# Patient Record
Sex: FEMALE | Race: BLACK OR AFRICAN AMERICAN | NOT HISPANIC OR LATINO | Employment: FULL TIME | URBAN - METROPOLITAN AREA
[De-identification: names, ages, dates, MRNs, and addresses within clinical notes are randomized per-mention and may not be internally consistent; named-entity substitution may affect disease eponyms.]

---

## 2019-03-11 ENCOUNTER — HOSPITAL ENCOUNTER (OUTPATIENT)
Dept: URGENT CARE | Facility: CLINIC | Age: 49
Discharge: HOME | End: 2019-03-11

## 2019-03-29 ENCOUNTER — HOSPITAL ENCOUNTER (OUTPATIENT)
Dept: URGENT CARE | Facility: CLINIC | Age: 49
Discharge: HOME | End: 2019-03-29
Attending: NURSE PRACTITIONER

## 2019-03-31 LAB — BACTERIA SPEC AEROBE CULT: NORMAL

## 2019-04-17 ENCOUNTER — HOSPITAL ENCOUNTER (OUTPATIENT)
Dept: URGENT CARE | Facility: CLINIC | Age: 49
Discharge: HOME | End: 2019-04-17
Attending: FAMILY MEDICINE

## 2019-09-11 ENCOUNTER — HOSPITAL ENCOUNTER (OUTPATIENT)
Dept: INFUSION THERAPY | Facility: HOSPITAL | Age: 49
Setting detail: RECURRING SERIES
Discharge: HOME | End: 2019-09-30

## 2020-02-03 ENCOUNTER — HOSPITAL ENCOUNTER (OUTPATIENT)
Dept: URGENT CARE | Facility: CLINIC | Age: 50
Discharge: HOME | End: 2020-02-03

## 2020-02-18 ENCOUNTER — HOSPITAL ENCOUNTER (OUTPATIENT)
Dept: URGENT CARE | Facility: CLINIC | Age: 50
Discharge: HOME | End: 2020-02-18
Attending: EMERGENCY MEDICINE

## 2020-02-20 LAB — BACTERIA SPEC AEROBE CULT: NORMAL

## 2020-06-25 ENCOUNTER — HOSPITAL ENCOUNTER (OUTPATIENT)
Dept: MAMMOGRAPHY | Facility: HOSPITAL | Age: 50
Discharge: HOME | End: 2020-06-25
Attending: NURSE PRACTITIONER

## 2020-07-16 ENCOUNTER — OFFICE VISIT CONVERTED (OUTPATIENT)
Dept: SURGERY | Facility: CLINIC | Age: 50
End: 2020-07-16
Attending: NURSE PRACTITIONER

## 2020-08-04 ENCOUNTER — HOSPITAL ENCOUNTER (OUTPATIENT)
Dept: MAMMOGRAPHY | Facility: HOSPITAL | Age: 50
Discharge: HOME | End: 2020-08-04
Attending: NURSE PRACTITIONER

## 2020-08-26 ENCOUNTER — HOSPITAL ENCOUNTER (OUTPATIENT)
Dept: PREADMISSION TESTING | Facility: HOSPITAL | Age: 50
Discharge: HOME | End: 2020-08-26
Attending: SURGERY

## 2020-08-27 LAB — SARS-COV-2 RNA SPEC QL NAA+PROBE: NOT DETECTED

## 2020-08-31 ENCOUNTER — HOSPITAL ENCOUNTER (OUTPATIENT)
Dept: GASTROENTEROLOGY | Facility: HOSPITAL | Age: 50
Setting detail: HOSPITAL OUTPATIENT SURGERY
Discharge: HOME | End: 2020-08-31
Attending: SURGERY

## 2020-08-31 LAB — HCG UR QL: NEGATIVE

## 2020-09-22 ENCOUNTER — OFFICE VISIT CONVERTED (OUTPATIENT)
Dept: SURGERY | Facility: CLINIC | Age: 50
End: 2020-09-22
Attending: SURGERY

## 2021-02-04 ENCOUNTER — HOSPITAL ENCOUNTER (OUTPATIENT)
Dept: ULTRASOUND IMAGING | Facility: HOSPITAL | Age: 51
Discharge: HOME | End: 2021-02-04

## 2021-05-10 NOTE — H&P
History and Physical      Patient Name: Julia Spain   Patient ID: [id number]   Sex: Female   YOB: 1970    Primary Care Provider: CHRISTINA GAN   Referring Provider: CHRISTINA GAN    Visit Date: July 16, 2020    Provider: ELVIA Goncalves   [facility]   Location Address: [address] [place] [address]  [id number]   Location Phone: [phone]          Chief Complaint  · Age 50 or over  · Here today for a pre-surgical colon screening visit  · Epigastric Pain  · Requesting EGD and Colonoscopy      History Of Present Illness  The patient is a 49 year old /Black female presenting [facility] the [facility] on a referral from CHRISTINA GAN.   Julia Spain needs to have a diagnostic colonoscopy and EGD.   Patient states that they have had a colonoscopy. 3 years ago   Patient currently complains of: epigastric pain   Patient Does not have family history of colon cancer.      Patient presents today on referral from Christina Akhtar for epigastric pain, anemia (hgb: 11.9) and for screening colonoscopy.  Admits to fatigue. Patient denies any lower abdominal pain, diarrhea, or rectal bleeding.  She denies any family history of colorectal cancer.      Patient had a cholecystectomy in 2017.  Last colonoscopy was 3 years ago, she does not remember who that is with her with the results of that was.    I have requested medical records from her PCP and referring provider.       Past Medical History  Disease Name Date Onset Notes   Allergic rhinitis, chronic --  --    Anemia, Unspecified --  --    Bowel Disease --  --    GERD --  --    High blood pressure --  --          Past Surgical History  Procedure Name Date Notes   Appendectomy --  --    Gallbladder --  --          Medication List  Name Date Started Instructions   Calcium 600 600 mg calcium (1,500 mg) oral tablet  take 1 tablet by oral route daily   cetirizine 5 mg oral tablet  take 1  tablet (5 mg) by oral route once daily   Flonase Allergy Relief 50 mcg/actuation nasal spray,suspension  spray 1 spray (50 mcg) in each nostril by intranasal route once daily   garlic 1,000 mg oral capsule  take 1 capsule by oral route daily   hydralazine 25 mg oral tablet  take 1 tablet by oral route As needed   lisinopril-hydrochlorothiazide 10-12.5 mg oral tablet  take 1 tablet by oral route once daily   Prilosec OTC 20 mg oral tablet,delayed release (DR/EC)  take 1 tablet by oral route daily   promethazine 6.25 mg/5 mL oral syrup  take 5 milliliters (6.25 mg) by oral route PRN   Prozac 40 mg oral capsule  take 1 capsule (40 mg) by oral route once daily in the evening   Singulair 10 mg oral tablet  take 1 tablet (10 mg) by oral route once daily in the evening   Vitamin D3 125 mcg (5,000 unit) oral tablet  take 1 tablet by oral route daily         Allergy List  Allergen Name Date Reaction Notes   Dilaudid --  --  --          Family Medical History  Disease Name Relative/Age Notes   Stroke Father/   --    Heart Disease Mother/   --    Hypertension Father/  Mother/   --          Social History  Finding Status Start/Stop Quantity Notes   Alcohol use --  --/-- --  07/16/2020 - Monthly   Tobacco Never --/-- --  --          Review of Systems  · Constitutional  o Admits  o : fatigue  o Denies  o : fever, chills  · Eyes  o Denies  o : yellowish discoloration of eyes  · HENT  o Denies  o : difficulty swallowing  · Cardiovascular  o Denies  o : chest pain, chest pain on exertion  · Respiratory  o Denies  o : shortness of breath  · Gastrointestinal  o Admits  o : abdominal pain  o Denies  o : nausea, vomiting, diarrhea, constipation  · Genitourinary  o Denies  o : abnormal color of urine  · Integument  o Denies  o : rash  · Neurologic  o Denies  o : tingling or numbness  · Musculoskeletal  o Denies  o : joint pain  · Endocrine  o Denies  o : weight gain, weight loss      Vitals  Date Time BP Position Site L\R Cuff Size HR RR  "TEMP (F) WT  HT  BMI kg/m2 BSA m2 O2 Sat        07/16/2020 02:40 PM       16  265lbs 4oz 5'  8\" 40.33 2.4           Physical Examination  · Constitutional  o Appearance  o : well developed, well-nourished, patient in no apparent distress  · Head and Face  o Head  o :   § Inspection  § : atraumatic, normocephalic  o Face  o :   § Inspection  § : no facial lesions  · Eyes  o Conjunctivae  o : conjunctivae normal  o Sclerae  o : sclerae white  · Neck  o Inspection/Palpation  o : normal appearance, no masses or tenderness, trachea midline  · Respiratory  o Respiratory Effort  o : breathing unlabored  · Skin and Subcutaneous Tissue  o General Inspection  o : no lesions present, no areas of discoloration, skin turgor normal, texture normal  · Neurologic  o Mental Status Examination  o :   § Orientation  § : grossly oriented to person, place and time  § Attention  § : attention normal, concentration abilities normal  § Fund of Knowledge  § : fund of knowledge within normal limits, patient aware of current events  o Gait and Station  o : normal gait, able to stand without difficulty  · Psychiatric  o Judgement and Insight  o : judgment and insight intact  o Mood and Affect  o : mood normal, affect appropriate              Assessment  · Abdominal Pain, Epigastric     789.06/R10.13  · Anemia     285.9/D64.9  · Screening for colon cancer     V76.51/Z12.11  · Pre-op testing     V72.84/Z01.818      Plan  · Orders  o Consent for Colonoscopy Screening-Possible risk/complications, benefits, and alternatives to surgical or invasive procedure have been explained to patient and/or legal guardian. -Patient has been evaluated and can tolerate anesthesia and/or sedation. Risks, benefits, and alternatives to anesthesia and sedation have been explained to patient and/or legal guardian. () - 789.06/R10.13, 285.9/D64.9, V76.51/Z12.11, V72.84/Z01.818 - 08/31/2020  o Consent for Esophagogastroduodenoscopy (EGD) with dilation - Possible " risks/complications, benefits, and alternatives to surgical or invasive procedure have been explained to patient and/or legal guardian. - Patient has been evaluated and can tolerate anesthesia and/or sedation. Risks, benefits, and alternatives to anesthesia and sedation have been explained to patient and/or legal guardian. (43921) - 789.06/R10.13, 285.9/D64.9, V76.51/Z12.11, V72.84/Z01.818 - 08/31/2020  o [facility] Pre-Op Covid-19 Screening (74462) - V72.84/Z01.818 - 08/27/2020  · Medications  o Medications have been Reconciled  o Transition of Care or Provider Policy  · Instructions  o Surgical Facility: [facility]  o Handouts Provided Pre-Procedure Instructions including date, time, and location of procedure.   o PLAN: Proceeed with colonoscopy. Patient understands risks/benefits and is willing to proceed.   o PLAN: Proceeed with EGD. Patient understands risks/benefits and is willing to proceed.   o ***Surgical Orders***  o RISK AND BENEFITS:  o Given these options, the patient has verbally expressed an understanding of the risks of the surgery and finds these risks acceptable. Will proceed with surgery as soon as possible.  o O.R. PREP: Per protocol   o IV: Per Anesthesia  o Please sign permit for: Colonoscopy with possible biopsies by Dr. Godoy.  o Please sign permit for: Esophagogastroduodenoscopy with possible biopsies and dilation by Dr. Godoy.  o The above History and Physical Examination has been completed within 30 days of admission.  o ***Patient Status***  o Outpatient  o Follow up in the in the office post procedure.  o Advised patient she would need COVID-19 testing prior to the procedures. Educated patient she needs self isolate in between testing and procedure. Patient verbalizes understanding is willing to proceed.  o Electronically Identified Patient Education Materials Provided Electronically  · Disposition  o Call or Return if symptoms worsen or persist.            Electronically Signed  by: ELVIA Goncalves -Author on July 17, 2020 08:55:57 AM

## 2021-05-13 NOTE — PROGRESS NOTES
"   Progress Note      Patient Name: Julia Spain   Patient ID: [id number]   Sex: Female   YOB: 1970    Primary Care Provider: MAYELIN GAN   Referring Provider: MAYELIN GAN    Visit Date: September 22, 2020    Provider: Franklin Godoy MD   Location: [facility]   Location Address: [address] [place] [address]  [id number]   Location Phone: [phone]          Chief Complaint  · Follow Up Surgery      History Of Present Illness     Ms. Spain is seen after upper endoscopy and colonoscopy. She was found to have a hiatal hernia and mild esophagitis as well as diverticulosis.       Past Medical History  Allergic rhinitis, chronic; Anemia, Unspecified; Bowel Disease; GERD; High blood pressure         Past Surgical History  Appendectomy; Colonoscopy; Gallbladder; Hernia         Medication List  Calcium 600 600 mg calcium (1,500 mg) oral tablet; cetirizine 5 mg oral tablet; Flonase Allergy Relief 50 mcg/actuation nasal spray,suspension; garlic 1,000 mg oral capsule; hydralazine 25 mg oral tablet; lisinopril-hydrochlorothiazide 10-12.5 mg oral tablet; Prilosec OTC 20 mg oral tablet,delayed release (DR/EC); promethazine 6.25 mg/5 mL oral syrup; Prozac 40 mg oral capsule; Singulair 10 mg oral tablet; Vitamin D3 125 mcg (5,000 unit) oral tablet         Allergy List  Dilaudid         Family Medical History  Stroke; Heart Disease; Hypertension         Social History  Alcohol use; Tobacco (Never)         Review of Systems  · Cardiovascular  o Denies  o : chest pain on exertion, shortness of breath, lower extremity swelling  · Respiratory  o Denies  o : wheezing, chronic cough, coughing up blood  · Gastrointestinal  o Denies  o : diarrhea, chronic abdominal pain, reflux symptoms      Vitals  Date Time BP Position Site L\R Cuff Size HR RR TEMP (F) WT  HT  BMI kg/m2 BSA m2 O2 Sat HC       09/22/2020 09:16 AM       14  270lbs 0oz 5'  8\" 41.05 2.42   "             Assessment  · Encounter for examination following surgery     V67.00/Z09      Plan  · Medications  o Medications have been Reconciled  o Transition of Care or Provider Policy     She was instructed as to a high fiber diet. She will follow-up on a PRN basis.             Electronically Signed by: Rut Mitchell-, -Author on September 22, 2020 03:06:39 PM  Electronically Co-signed by: Franklin Godoy MD -Reviewer on September 28, 2020 09:10:25 AM

## 2021-05-14 VITALS — RESPIRATION RATE: 14 BRPM | HEIGHT: 68 IN | WEIGHT: 270 LBS | BODY MASS INDEX: 40.92 KG/M2

## 2021-05-15 VITALS — HEIGHT: 68 IN | WEIGHT: 265.25 LBS | RESPIRATION RATE: 16 BRPM | BODY MASS INDEX: 40.2 KG/M2

## 2021-05-22 ENCOUNTER — TRANSCRIBE ORDERS (OUTPATIENT)
Dept: ADMINISTRATIVE | Facility: HOSPITAL | Age: 51
End: 2021-05-22

## 2021-05-22 DIAGNOSIS — N64.59 ABNORMAL BREAST FINDING: Primary | ICD-10-CM

## 2021-07-06 ENCOUNTER — HOSPITAL ENCOUNTER (OUTPATIENT)
Dept: MAMMOGRAPHY | Facility: HOSPITAL | Age: 51
Discharge: HOME | End: 2021-07-06

## 2021-07-06 ENCOUNTER — HOSPITAL ENCOUNTER (OUTPATIENT)
Dept: ULTRASOUND IMAGING | Facility: HOSPITAL | Age: 51
Discharge: HOME | End: 2021-07-06

## 2021-07-06 DIAGNOSIS — N64.59 ABNORMAL BREAST FINDING: ICD-10-CM

## 2021-07-06 PROCEDURE — G0279 TOMOSYNTHESIS, MAMMO: HCPCS

## 2021-07-06 PROCEDURE — 77066 DX MAMMO INCL CAD BI: CPT | Performed by: RADIOLOGY

## 2021-07-06 PROCEDURE — 76642 ULTRASOUND BREAST LIMITED: CPT | Performed by: RADIOLOGY

## 2021-07-06 PROCEDURE — 77066 DX MAMMO INCL CAD BI: CPT

## 2021-07-06 PROCEDURE — 76642 ULTRASOUND BREAST LIMITED: CPT

## 2021-07-06 PROCEDURE — 77062 BREAST TOMOSYNTHESIS BI: CPT | Performed by: RADIOLOGY

## 2021-10-08 ENCOUNTER — OFFICE VISIT (OUTPATIENT)
Dept: ORTHOPEDIC SURGERY | Facility: CLINIC | Age: 51
End: 2021-10-08

## 2021-10-08 VITALS — HEART RATE: 55 BPM | OXYGEN SATURATION: 98 % | BODY MASS INDEX: 41.98 KG/M2 | HEIGHT: 68 IN | WEIGHT: 277 LBS

## 2021-10-08 DIAGNOSIS — M25.521 RIGHT ELBOW PAIN: ICD-10-CM

## 2021-10-08 DIAGNOSIS — M25.531 RIGHT WRIST PAIN: ICD-10-CM

## 2021-10-08 DIAGNOSIS — M77.8 RIGHT ELBOW TENDINITIS: ICD-10-CM

## 2021-10-08 DIAGNOSIS — S46.911A STRAIN OF RIGHT ELBOW, INITIAL ENCOUNTER: Primary | ICD-10-CM

## 2021-10-08 DIAGNOSIS — S63.501A SPRAIN OF RIGHT WRIST, INITIAL ENCOUNTER: ICD-10-CM

## 2021-10-08 PROCEDURE — 99203 OFFICE O/P NEW LOW 30 MIN: CPT | Performed by: ORTHOPAEDIC SURGERY

## 2021-10-08 RX ORDER — ALPRAZOLAM 0.25 MG/1
0.25 TABLET ORAL 2 TIMES DAILY PRN
COMMUNITY

## 2021-10-08 RX ORDER — CETIRIZINE HYDROCHLORIDE 10 MG/1
10 TABLET ORAL DAILY
COMMUNITY

## 2021-10-08 NOTE — PROGRESS NOTES
"Chief Complaint  Initial Evaluation of the Right Wrist and Initial Evaluation of the Right Elbow     Subjective      Julia Spain presents to [facility] for an evaluation of right wrist and right elbow. Patient was trying to stop a patient from falling at work which resulted in her injuring her arm in the process. She injured her arm in August. This is a worker's comp case. She states her elbow hurts the most but her wrist pain is on and off. She has been taking Tylenol. She takes naprosyn when she eats a large meal. She had gastric sleeve and isn't suppose to take it. She also has been wearing a elbow brace. She states she has been going to work. She states her wrist pain has been improving but her elbow pain is consistent.      Allergies   Allergen Reactions   • Dilaudid [Hydromorphone] Itching        Social History     Socioeconomic History   • Marital status: Single     Spouse name: Not on file   • Number of children: Not on file   • Years of education: Not on file   • Highest education level: Not on file   Tobacco Use   • Smoking status: Former Smoker        Review of Systems     Objective   Vital Signs:   Pulse 55   Ht 172.7 cm (68\")   Wt 126 kg (277 lb)   SpO2 98%   BMI 42.12 kg/m²       Physical Exam  Constitutional:       Appearance: Normal appearance. Patient is well-developed and normal weight.   HENT:      Head: Normocephalic.      Right Ear: Hearing and external ear normal.      Left Ear: Hearing and external ear normal.      Nose: Nose normal.   Eyes:      Conjunctiva/sclera: Conjunctivae normal.   Cardiovascular:      Rate and Rhythm: Normal rate.   Pulmonary:      Effort: Pulmonary effort is normal.      Breath sounds: No wheezing or rales.   Abdominal:      Palpations: Abdomen is soft.      Tenderness: There is no abdominal tenderness.   Musculoskeletal:      Cervical back: Normal range of motion.   Skin:     Findings: No rash.   Neurological:      Mental " Status: Patient  is alert and oriented to person, place, and time.   Psychiatric:         Mood and Affect: Mood and affect normal.         Judgment: Judgment normal.       Ortho Exam      RIGHT ARM: Radial pulse 2+, ulnar pulse 2+. No swelling, skin discoloration or atrophy. Good tone of deltoid, biceps, triceps, wrist extensors, and wrist flexors.  Sensation grossly intact. Neurovascular intact.  Skin intact. Pain with flexion and extension of the elbow. Tenderness about the elbow. Full wrist extension, full wrist flexion, full , full thumb opposition, full PIP flexors, full DIP flexors, full PIP extensors, full finger adduction, full finger abduction. Negative Tinel's. Negative Finkelsteins.       Procedures        Imaging Results (Most Recent)     Procedure Component Value Units Date/Time    XR Elbow 2 View Right [[id number]] Resulted: 10/08/21 1534     Updated: 10/08/21 1534    XR Wrist 2 View Right [[id number]] Resulted: 10/08/21 1533     Updated: 10/08/21 1534           Result Review :       X-Ray Report:  Right elbow(s) and Right wrist(s) X-Ray  Indication: Evaluation of right wrist and elbow pain   AP and Lateral view(s)  Findings: No acute osseous abnormalities. No fracture or dislocation.   Prior studies available for comparison: no          Assessment and Plan     DX: Right elbow strain  Right wrist sprain     Patient can't take NSAIDs because of gastric surgery. She was given a prescription for PT for her elbow and wrist. We will obtain an MRI if she fails to improve. We will order an extension brace for her right elbow.     Call or return if worsening symptoms.    Follow Up     PRN.       Patient was given instructions and counseling regarding her condition or for health maintenance advice. Please see specific information pulled into the AVS if appropriate.     Scribed for Carlos Manuel Tyler MD by Tracy Robison.  10/08/21   15:31 EDT      I have personally performed the services described in this  document as scribed by the above individual and it is both accurate and complete. Carlos Manuel Tyler MD 10/11/21

## 2021-11-05 ENCOUNTER — TREATMENT (OUTPATIENT)
Dept: PHYSICAL THERAPY | Facility: CLINIC | Age: 51
End: 2021-11-05

## 2021-11-05 ENCOUNTER — TRANSCRIBE ORDERS (OUTPATIENT)
Dept: PHYSICAL THERAPY | Facility: CLINIC | Age: 51
End: 2021-11-05

## 2021-11-05 DIAGNOSIS — S86.111A GASTROCNEMIUS STRAIN, RIGHT, INITIAL ENCOUNTER: Primary | ICD-10-CM

## 2021-11-05 PROCEDURE — 97161 PT EVAL LOW COMPLEX 20 MIN: CPT | Performed by: PHYSICAL THERAPIST

## 2021-11-05 PROCEDURE — 97110 THERAPEUTIC EXERCISES: CPT | Performed by: PHYSICAL THERAPIST

## 2021-11-05 NOTE — PROGRESS NOTES
" Physical Therapy Initial Evaluation and Plan of Care      Patient: Julia Spain   : 1970  Diagnosis/ICD-10 Code:  Gastrocnemius strain, right, initial encounter [S86.111A]  Referring practitioner: Baljit Palomino MD  Date of Initial Visit: 2021  Today's Date: 2021  Patient seen for 1 sessions           Subjective Questionnaire: LEFS: 52/80=35% limitation      Subjective Evaluation    History of Present Illness  Mechanism of injury: Patient is a 50 yr old female referred to physical therapy with diagnosis of right gastroc strain. Patient strained calf on 10/28/21 pulling patient across from stretcher to bed.  Patient felt a \"pop\" and severe pain.  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  Patient reports pain in right calf getting better, but right elbow still hurts. (Therapist had patient schedule her right elbow evaluation)    Pain  Current pain ratin  At best pain ratin  At worst pain rating: 10  Location: right calf  Quality: sharp  Relieving factors: change in position, ice and medications    Patient Goals  Patient goals for therapy: decreased pain and return to work             Objective          Palpation     Right Tenderness of the lateral gastrocnemius and medial gastrocnemius.     Active Range of Motion     Right Ankle/Foot   Dorsiflexion (ke): 10 degrees with pain  Plantar flexion: WFL  Inversion: WFL  Eversion: WFL    Strength/Myotome Testing     Right Ankle/Foot   Dorsiflexion: 4+  Plantar flexion: 4+  Inversion: WFL.   Eversion: WFL.     Tests     Right Ankle/Foot   Negative for Homans' sign and Bain.           Assessment & Plan     Assessment  Assessment details: Pt presents with limitations, noted by evaluation that impede patient's ability to tolerate functional activity/mobility.  The skills of a therapist will be required to safely and effectively implement the following treatment plan to restore maximal level of function.    Prognosis: " good    Goals  Plan Goals: 1. The patient has limited ROM for the right ankle.   LTG 1: 2 weeks:  In order to allow the patient greater ease with forward, lateral, and diagonal mobility the patient will demonstrate improved ROM of the right ankle as follows:  20 degrees of dorsiflexion  STATUS:  New      2. The patient has limited strength of the right ankle.   LTG 2: 2 weeks:  In order to provide greater joint stability of the right ankle the patient will demonstrate improved strength of the right ankle as follows:  5/5 dorsiflexion  and 5/5 plantar flexion.   STATUS:  New      3 The patient complains of right ankle pain.   LTG 3: 2 weeks:  The patient will report a pain rating of 2/10 or better in order to improve tolerance to activities of daily living and improve sleep quality.   STATUS:  New      4. Mobility: Walking/Moving Around Functional Limitation     LTG 4: 2 weeks:  The patient will demonstrate 12% limitation by achieving a score of 70/80 on the Lower Extremity Functional Scale.   STATUS:  New     TREATMENT: Manual therapy, therapeutic exercise, home exercise instruction, and modalities as needed to include:  moist heat, electrical stimulation, ultrasound, and ice    Plan  Therapy options: will be seen for skilled physical therapy services  Planned modality interventions: cryotherapy, thermotherapy (hydrocollator packs) and TENS  Planned therapy interventions: manual therapy, therapeutic activities, stretching, strengthening, soft tissue mobilization, flexibility, functional ROM exercises and home exercise program  Frequency: 1x week  Duration in weeks: 2  Treatment plan discussed with: patient        Visit Diagnoses:    ICD-10-CM ICD-9-CM   1. Gastrocnemius strain, right, initial encounter  S86.111A 844.8       Timed:  Manual Therapy:   5      mins  38287;  Therapeutic Exercise:    8     mins  29827;     Neuromuscular Pa:        mins  35069;    Therapeutic Activity:          mins  39572;     Gait  Training:           mins  91941;     Ultrasound:          mins  45163;    Electrical Stimulation:         mins  88955 ( );    Untimed:  Electrical Stimulation:         mins  91558 ( );  Mechanical Traction:         mins  88782;   PT evaluation   30 mins    Timed Treatment:   13   mins   Total Treatment:     43   mins    PT SIGNATURE: Venice Levine, PT     Electronically singed 11/5/2021    [place] PT license: [id number]        Initial Certification  Certification Period: 11/5/2021 thru 2/2/2022  I certify that the therapy services are furnished while this patient is under my care.  The services outlined above are required by this patient, and will be reviewed every 90 days.     PHYSICIAN: Baljit Palomino MD      DATE:     Please sign and return via fax to [phone]  Thank you, [facility].

## 2021-11-11 ENCOUNTER — TREATMENT (OUTPATIENT)
Dept: PHYSICAL THERAPY | Facility: CLINIC | Age: 51
End: 2021-11-11

## 2021-11-11 DIAGNOSIS — M25.521 RIGHT ELBOW PAIN: ICD-10-CM

## 2021-11-11 DIAGNOSIS — S46.911D STRAIN OF RIGHT ELBOW, SUBSEQUENT ENCOUNTER: Primary | ICD-10-CM

## 2021-11-11 PROCEDURE — 97140 MANUAL THERAPY 1/> REGIONS: CPT | Performed by: OCCUPATIONAL THERAPIST

## 2021-11-11 PROCEDURE — 97166 OT EVAL MOD COMPLEX 45 MIN: CPT | Performed by: OCCUPATIONAL THERAPIST

## 2021-11-11 PROCEDURE — 97110 THERAPEUTIC EXERCISES: CPT | Performed by: OCCUPATIONAL THERAPIST

## 2021-11-11 NOTE — PROGRESS NOTES
Outpatient Occupational Therapy Ortho Initial Evaluation    Patient: Julia pSain   : 1970  Diagnosis/ICD-10 Code:  Strain of right elbow, subsequent encounter [S46.911D]  Referring practitioner: Carlos Manuel Tyler MD  Date of Initial Visit: 2021  Today's Date: 2021  Patient seen for 1 sessions               Subjective Questionnaire: QuickDASH: 26      Subjective Evaluation    History of Present Illness  Date of onset: 2021  Mechanism of injury: 21 Kept a patient from falling, and strained R elbow. Been using counterforce brace, taking tylenol.      Patient Occupation: nurse at [employer]   Precautions and Work Restrictions: light duty due to calf strain; elbow extension brace at night Quality of life: excellent    Pain  Current pain ratin  At worst pain rating: 10  Quality: dull ache (when I straighten my arm)  Relieving factors: change in position  Aggravating factors: sleeping  Progression: no change    Social Support  Lives in: multiple-level home  Lives with: spouse    Hand dominance: right    Diagnostic Tests  X-ray: normal    Patient Goals  Patient goals for therapy: return to work and decreased pain           Past Medical hx: no significant medical hx.    Objective          Active Range of Motion     Right Elbow   Flexion: 130 degrees   Extension: 0 degrees   Forearm supination: 75 degrees   Forearm pronation: 90 degrees     Right Wrist   Wrist flexion: 85 degrees   Wrist extension: 75 degrees     Strength/Myotome Testing     Left Wrist/Hand      (2nd hand position)   lbs: 60    Right Wrist/Hand      (2nd hand position)   lbs: 65          Assessment & Plan     Assessment  Impairments: abnormal coordination, abnormal muscle firing, abnormal or restricted ROM, activity intolerance, impaired physical strength, lacks appropriate home exercise program, pain with function, safety issue and weight-bearing intolerance  Assessment details: Pain is reproducible with elbow  extension, resisted pronation into  Extension.  Point tenderness/ache along radial collateral ligament.                Prognosis: good  Functional Limitations: carrying objects, lifting, pulling, pushing, reaching behind back, reaching overhead and unable to perform repetitive tasks  Goals  Plan Goals: 1. The patient complains of pain in the R elbow                  LTG 1: 12 weeks:  The patient will report a pain rating of 0/10 or better in order to improve sleep quality and tolerance to performance of activities of daily living.                                  STATUS:  New                  STG 1a: 4weeks:  The patient will report a pain rating of 5/10 or better.                                   STATUS:  New  2. The patient has difficulty with sleeping secondary to R elbow pain                  LTG 2: 12 weeks:  The patient will report full ROM of R elbow from flexion to extension without increased pain to sleep.                                  STATUS:  New                   STG 2a: 4 weeks:  The patient will demonstrate 5-130 degrees of elbow HARRY without increased discomfort.                                  STATUS:  New                             3. Carrying, Moving, and Handling Objects Functional Limitation                                   LTG 3: 12 weeks:  The patient will demonstrate 0% limitation by achieving a score of 11 on the Quick DASH.                                  STATUS:  New                  STG 3a: 4 weeks:  The patient will demonstrate 1-19% limitation by achieving a score of 18 on the Quick DASH.                                    STATUS:  New                    TREATMENT: Orthotic fabrication/fitting/management and training, Manual therapy, therapeutic exercise, home exercise instruction, and modalities as needed to include: electrical stimulation, ultrasound, moist heat, paraffin, fluidotherapy and ice.      Plan  Planned modality interventions: TENS, thermotherapy (hydrocollator  packs), thermotherapy (paraffin bath), ultrasound and electrical stimulation/Russian stimulation  Other planned modality interventions: fluidotherapy  Planned therapy interventions: manual therapy, ADL retraining, motor coordination training, neuromuscular re-education, soft tissue mobilization, fine motor coordination training, body mechanics training, balance/weight-bearing training, functional ROM exercises, flexibility, spinal/joint mobilization, strengthening, stretching, therapeutic activities, IADL retraining, joint mobilization and home exercise program  Frequency: 2x week  Duration in weeks: 12  Treatment plan discussed with: patient        Patient is indicated for skilled occupational therapy services.    History # of Personal Factors and/or Comorbidities: MODERATE (1-2)  Examination of Body System(s): # of elements: MODERATE (3)  Clinical Presentation: STABLE   Clinical Decision Making: MODERATE     Evaluation:  Low Complexity:    0     mins  06147;  Mod Complexity:    30     mins  55851;  High Complexity:    0     mins  40122;    Timed:  Manual Therapy:    10     mins  69523;  Therapeutic Exercise:    10     mins  14345;  Therapeutic Activity:    0     mins  61868;     Neuromuscular Pa:    5    mins  39649;    Ultrasound:     0     mins  73133;    Electrical Stimulation:    0     mins  06272;    Untimed:  Electrical Stimulation:    0     mins  68692 ( );  Fluidotherapy:        0    mins  41698  Paraffin:                          0    mins  27954    Timed Treatment:   25   mins   Total Treatment:     55   mins      OT SIGNATURE: BRIANDA Mancera, OTR/L, T     Electronically signed    [place] LICENSE: [id number]   DATE TREATMENT INITIATED: 11/11/2021    Initial Certification  Certification Period: 11/11/2021 thru 2/8/2022  I certify that the therapy services are furnished while this patient is under my care.  The services outlined above are required by this patient, and will be reviewed every 90  days.     PHYSICIAN: Carlos Manuel Tyler MD      DATE:     Please sign and return via fax to [phone]   Thank you, [facility] Occupational Therapy.

## 2021-11-15 ENCOUNTER — TREATMENT (OUTPATIENT)
Dept: PHYSICAL THERAPY | Facility: CLINIC | Age: 51
End: 2021-11-15

## 2021-11-15 DIAGNOSIS — S46.911D STRAIN OF RIGHT ELBOW, SUBSEQUENT ENCOUNTER: Primary | ICD-10-CM

## 2021-11-15 DIAGNOSIS — M25.521 RIGHT ELBOW PAIN: ICD-10-CM

## 2021-11-15 PROCEDURE — 97112 NEUROMUSCULAR REEDUCATION: CPT | Performed by: OCCUPATIONAL THERAPIST

## 2021-11-15 PROCEDURE — 97140 MANUAL THERAPY 1/> REGIONS: CPT | Performed by: OCCUPATIONAL THERAPIST

## 2021-11-15 PROCEDURE — 97110 THERAPEUTIC EXERCISES: CPT | Performed by: OCCUPATIONAL THERAPIST

## 2021-11-15 NOTE — PROGRESS NOTES
Occupational Therapy Daily Treatment Note      Patient: Julia Spain   : 1970  Referring practitioner: Carlos Manuel Tyler MD  Date of Initial Visit: Type: THERAPY  Noted: 2021  Today's Date: 11/15/2021  Patient seen for 2 sessions    ICD-10-CM ICD-9-CM   1. Strain of right elbow, subsequent encounter  S46.911D V58.89     841.9   2. Right elbow pain  M25.521 719.42          Julia Spain reports It is really hurting more 6/10.  Exercises are going well. Pt did report sleeping some better since last visit.         Objective   See Exercise, Manual, and Modality Logs for complete treatment.   kinesiotape applied to R UE radial nerve path proximal to distal, and I strip split in center over LE for support of radial collateral ligament and to create space over radial tunnel.     Assessment/Plan  After treatment of ASTYM, cupping, and taping pt had decreased pain to 4/10 with functional movement patterns.       Cont per POC           Timed:  Manual Therapy:    25     mins  64132;  Therapeutic Exercise:    10     mins  42380;  Therapeutic Activity:    0     mins  56831;     Neuromuscular Pa:    10    mins  65921;    Ultrasound:     0     mins  72740;    Electrical Stimulation:    0     mins  24706;    Untimed:  Electrical Stimulation:    0     mins  81808 ( );  Fluidotherapy:        0    mins  12923  Paraffin:                          0    mins  81655    Timed Treatment:   45   mins   Total Treatment:     45   mins    OT SIGNATURE: BRIANDA Mancera, OTR/L, CHT     Electronically signed    [place] LICENSE: [id number]

## 2021-11-24 ENCOUNTER — TREATMENT (OUTPATIENT)
Dept: PHYSICAL THERAPY | Facility: CLINIC | Age: 51
End: 2021-11-24

## 2021-11-24 DIAGNOSIS — S46.911D STRAIN OF RIGHT ELBOW, SUBSEQUENT ENCOUNTER: Primary | ICD-10-CM

## 2021-11-24 DIAGNOSIS — M25.521 RIGHT ELBOW PAIN: ICD-10-CM

## 2021-11-24 PROCEDURE — 97140 MANUAL THERAPY 1/> REGIONS: CPT | Performed by: OCCUPATIONAL THERAPIST

## 2021-11-24 PROCEDURE — 97110 THERAPEUTIC EXERCISES: CPT | Performed by: OCCUPATIONAL THERAPIST

## 2021-11-24 PROCEDURE — 97530 THERAPEUTIC ACTIVITIES: CPT | Performed by: OCCUPATIONAL THERAPIST

## 2021-11-24 NOTE — PROGRESS NOTES
Occupational Therapy Daily Treatment Note      Patient: Julia Spain   : 1970  Referring practitioner: No ref. provider found  Date of Initial Visit: Type: THERAPY  Noted: 2021  Today's Date: 2021  Patient seen for 3 sessions    ICD-10-CM ICD-9-CM   1. Strain of right elbow, subsequent encounter  S46.911D V58.89     841.9   2. Right elbow pain  M25.521 719.42          Julia Spain reports still having with lifting.  The brace helped some with sleeping. 8/10 pain with elbow flexion        Objective   See Exercise, Manual, and Modality Logs for complete treatment.   Pain is more centralized to biceps insertion this date. 3/10 pain after ASTYM with functional mobility    Kinesiotape applied to radial nerve proximal to distal, biceps inhibition, and I strip over LE/ Biceps insertion      Assessment/Plan  Pain is more defined this date over biceps tendon insertion, medial and lateral epicondyle and gross elbow pain overall is decreased.    Cont per POC           Timed:  Manual Therapy:    10     mins  29386;  Therapeutic Exercise:    10     mins  09498;  Therapeutic Activity:    10     mins  93896;     Neuromuscular Pa:    0    mins  09278;    Ultrasound:     0     mins  71386;    Electrical Stimulation:    0     mins  80492;    Untimed:  Electrical Stimulation:    0     mins  74255 ( );  Fluidotherapy:        0    mins  39692  Paraffin:                          0    mins  92159    Timed Treatment:   30   mins   Total Treatment:     30   mins    OT SIGNATURE: BRIANDA Mancera, NAVDEEPR/L, CHT     Electronically signed    [place] LICENSE: [id number]

## 2021-12-01 ENCOUNTER — TREATMENT (OUTPATIENT)
Dept: PHYSICAL THERAPY | Facility: CLINIC | Age: 51
End: 2021-12-01

## 2021-12-01 DIAGNOSIS — M25.521 RIGHT ELBOW PAIN: ICD-10-CM

## 2021-12-01 DIAGNOSIS — S46.911D STRAIN OF RIGHT ELBOW, SUBSEQUENT ENCOUNTER: Primary | ICD-10-CM

## 2021-12-01 PROCEDURE — 97140 MANUAL THERAPY 1/> REGIONS: CPT | Performed by: OCCUPATIONAL THERAPIST

## 2021-12-01 PROCEDURE — 97530 THERAPEUTIC ACTIVITIES: CPT | Performed by: OCCUPATIONAL THERAPIST

## 2021-12-01 PROCEDURE — 97110 THERAPEUTIC EXERCISES: CPT | Performed by: OCCUPATIONAL THERAPIST

## 2021-12-06 ENCOUNTER — TREATMENT (OUTPATIENT)
Dept: PHYSICAL THERAPY | Facility: CLINIC | Age: 51
End: 2021-12-06

## 2021-12-06 DIAGNOSIS — S46.911D STRAIN OF RIGHT ELBOW, SUBSEQUENT ENCOUNTER: Primary | ICD-10-CM

## 2021-12-06 DIAGNOSIS — M25.521 RIGHT ELBOW PAIN: ICD-10-CM

## 2021-12-06 PROCEDURE — 97530 THERAPEUTIC ACTIVITIES: CPT | Performed by: OCCUPATIONAL THERAPIST

## 2021-12-06 PROCEDURE — 97110 THERAPEUTIC EXERCISES: CPT | Performed by: OCCUPATIONAL THERAPIST

## 2021-12-06 PROCEDURE — 97140 MANUAL THERAPY 1/> REGIONS: CPT | Performed by: OCCUPATIONAL THERAPIST

## 2021-12-06 NOTE — PROGRESS NOTES
Occupational Therapy Daily Treatment Note      Patient: Julia Spain   : 1970  Referring practitioner: Carlos Manuel Tyler MD  Date of Initial Visit: Type: THERAPY  Noted: 2021  Today's Date: 2021  Patient seen for 5 sessions    ICD-10-CM ICD-9-CM   1. Strain of right elbow, subsequent encounter  S46.911D V58.89     841.9   2. Right elbow pain  M25.521 719.42          Julia Spain reports Pain has changed from both sides of the elbow to center, pointing at biceps insertion.  7/10 pain.     Objective   See Exercise, Manual, and Modality Logs for complete treatment.   Needs verbal cues for correct positioning with stretching and elongation exercises.   Pt return demo understanding.     Assessment/Plan  After tricep loading, decreased pain to straighten elbow.      Cont per POC           Timed:  Manual Therapy:    10     mins  86026;  Therapeutic Exercise:    10     mins  68371;  Therapeutic Activity:    10     mins  80634;     Neuromuscular Pa:    0    mins  36478;    Ultrasound:     0     mins  89137;    Electrical Stimulation:    0     mins  70174;    Untimed:  Electrical Stimulation:    0     mins  36314 ( );  Fluidotherapy:        0    mins  16408  Paraffin:                          0    mins  94094    Timed Treatment:   30   mins   Total Treatment:     30   mins    OT SIGNATURE: BRIANDA Mancera, OTR/L, CHT     Electronically signed    [place] LICENSE: [id number]

## 2021-12-08 ENCOUNTER — TREATMENT (OUTPATIENT)
Dept: PHYSICAL THERAPY | Facility: CLINIC | Age: 51
End: 2021-12-08

## 2021-12-08 DIAGNOSIS — M25.521 RIGHT ELBOW PAIN: ICD-10-CM

## 2021-12-08 DIAGNOSIS — S46.911D STRAIN OF RIGHT ELBOW, SUBSEQUENT ENCOUNTER: Primary | ICD-10-CM

## 2021-12-08 PROCEDURE — 97530 THERAPEUTIC ACTIVITIES: CPT | Performed by: OCCUPATIONAL THERAPIST

## 2021-12-08 PROCEDURE — 97110 THERAPEUTIC EXERCISES: CPT | Performed by: OCCUPATIONAL THERAPIST

## 2021-12-08 PROCEDURE — 97140 MANUAL THERAPY 1/> REGIONS: CPT | Performed by: OCCUPATIONAL THERAPIST

## 2021-12-08 NOTE — PROGRESS NOTES
Occupational Therapy Daily Treatment Note      Patient: Julia Spain   : 1970  Referring practitioner: Carlos Manuel Tyler MD  Date of Initial Visit: Type: THERAPY  Noted: 2021  Today's Date: 2021  Patient seen for 6 sessions    ICD-10-CM ICD-9-CM   1. Strain of right elbow, subsequent encounter  S46.911D V58.89     841.9   2. Right elbow pain  M25.521 719.42          Julia Spain reports I am feeling better today.    Objective   See Exercise, Manual, and Modality Logs for complete treatment.   Pt had less crepitus and inflammation at biceps insertion this date.     Assessment/Plan  Decreased pain, increased strength and endurance and less tightness noted this date by  The patient.      Cont per POC           Timed:  Manual Therapy:    10     mins  24029;  Therapeutic Exercise:    10     mins  75087;  Therapeutic Activity:    10     mins  98708;     Neuromuscular Pa:    0    mins  92149;    Ultrasound:     0     mins  83193;    Electrical Stimulation:    0     mins  83957;    Untimed:  Electrical Stimulation:    0     mins  82668 ( );  Fluidotherapy:        0    mins  60879  Paraffin:                          0    mins  99189    Timed Treatment:   30   mins   Total Treatment:     30   mins    OT SIGNATURE: BRIANDA Mancera, OTR/L, CHT     Electronically signed    [place] LICENSE: [id number]

## 2021-12-13 ENCOUNTER — TREATMENT (OUTPATIENT)
Dept: PHYSICAL THERAPY | Facility: CLINIC | Age: 51
End: 2021-12-13

## 2021-12-13 DIAGNOSIS — S46.911D STRAIN OF RIGHT ELBOW, SUBSEQUENT ENCOUNTER: Primary | ICD-10-CM

## 2021-12-13 DIAGNOSIS — M25.521 RIGHT ELBOW PAIN: ICD-10-CM

## 2021-12-13 PROCEDURE — 97140 MANUAL THERAPY 1/> REGIONS: CPT | Performed by: OCCUPATIONAL THERAPIST

## 2021-12-13 PROCEDURE — 97530 THERAPEUTIC ACTIVITIES: CPT | Performed by: OCCUPATIONAL THERAPIST

## 2021-12-13 PROCEDURE — 97110 THERAPEUTIC EXERCISES: CPT | Performed by: OCCUPATIONAL THERAPIST

## 2021-12-13 NOTE — PROGRESS NOTES
Occupational Therapy Daily Treatment Note      Patient: Julia Spain   : 1970  Referring practitioner: Carlos Manuel Tyler MD  Date of Initial Visit: Type: THERAPY  Noted: 2021  Today's Date: 2021  Patient seen for 7 sessions    ICD-10-CM ICD-9-CM   1. Strain of right elbow, subsequent encounter  S46.911D V58.89     841.9   2. Right elbow pain  M25.521 719.42          Julia Spain reports I am feeling better       Objective   See Exercise, Manual, and Modality Logs for complete treatment.   Adding weight to bicep curls tolerated #1, but #2 was too much today    Assessment/Plan  Pt progressing with tolerance for lifting and functional use of R UE.       Cont per POC           Timed:  Manual Therapy:    10     mins  84193;  Therapeutic Exercise:    10     mins  09298;  Therapeutic Activity:    10     mins  29579;     Neuromuscular Pa:    0    mins  32747;    Ultrasound:     0     mins  89181;    Electrical Stimulation:    0     mins  40803;    Untimed:  Electrical Stimulation:    0     mins  17417 ( );  Fluidotherapy:        0    mins  00907  Paraffin:                          0    mins  30192    Timed Treatment:   30   mins   Total Treatment:     30   mins    OT SIGNATURE: BRIANDA Mancera, OTR/L, CHT     Electronically signed    [place] LICENSE: [id number]

## 2021-12-27 ENCOUNTER — APPOINTMENT (OUTPATIENT)
Dept: VACCINE CLINIC | Facility: HOSPITAL | Age: 51
End: 2021-12-27

## 2021-12-28 ENCOUNTER — TREATMENT (OUTPATIENT)
Dept: PHYSICAL THERAPY | Facility: CLINIC | Age: 51
End: 2021-12-28

## 2021-12-28 DIAGNOSIS — M25.521 RIGHT ELBOW PAIN: ICD-10-CM

## 2021-12-28 DIAGNOSIS — S46.911D STRAIN OF RIGHT ELBOW, SUBSEQUENT ENCOUNTER: Primary | ICD-10-CM

## 2021-12-28 PROCEDURE — 97140 MANUAL THERAPY 1/> REGIONS: CPT | Performed by: OCCUPATIONAL THERAPIST

## 2021-12-28 PROCEDURE — 97530 THERAPEUTIC ACTIVITIES: CPT | Performed by: OCCUPATIONAL THERAPIST

## 2021-12-28 PROCEDURE — 97110 THERAPEUTIC EXERCISES: CPT | Performed by: OCCUPATIONAL THERAPIST

## 2021-12-28 NOTE — PROGRESS NOTES
Re-Assessment / Re-Certification      Patient: Julia Spain   : 1970  Diagnosis/ICD-10 Code:  Strain of right elbow, subsequent encounter [S46.911D]  Referring practitioner: Carlos Manuel Tyler MD  Date of Initial Visit: Type: THERAPY  Noted: 2021  Today's Date: 2021  Patient seen for 8 sessions      Subjective:   Julia Spain reports: I am doing so much better.  No pain, doing the exercises with the band has helped.  Subjective Questionnaire: QuickDASH: 21  Clinical Progress: improved  Home Program Compliance: Yes  Treatment has included: therapeutic exercise, neuromuscular re-education and manual therapy    Subjective   Objective   Right Elbow   Flexion: 145 degrees   Extension: 0 degrees   Forearm supination: 90 degrees   Forearm pronation: 90 degrees      Right Wrist   Wrist flexion: 90 degrees   Wrist extension: 75 degrees      Strength/Myotome Testing      Left Wrist/Hand       (2nd hand position)   lbs: 60     Right Wrist/Hand       (2nd hand position)   lbs: 65   Assessment/Plan    Visit Diagnoses:    ICD-10-CM ICD-9-CM   1. Strain of right elbow, subsequent encounter  S46.911D V58.89     841.9   2. Right elbow pain  M25.521 719.42       Progress toward previous goals: All Met    Plan Goals: 1. The patient complains of pain in the R elbow                  LTG 1: 12 weeks:  The patient will report a pain rating of 0/10 or better in order to improve sleep quality and tolerance to performance of activities of daily living.                                  STATUS:  MET                  STG 1a: 4weeks:  The patient will report a pain rating of 5/10 or better.                                   STATUS:  MET  2. The patient has difficulty with sleeping secondary to R elbow pain                  LTG 2: 12 weeks:  The patient will report full ROM of R elbow from flexion to extension without increased pain to sleep.                                  STATUS:  MET                  STG  2a: 4 weeks:  The patient will demonstrate 5-130 degrees of elbow HARRY without increased discomfort.                                  STATUS:  MET                             3. Carrying, Moving, and Handling Objects Functional Limitation                                   LTG 3: 12 weeks:  The patient will demonstrate 0% limitation by achieving a score of 11 on the Quick DASH.                                  STATUS:  NOT MET                  STG 3a: 4 weeks:  The patient will demonstrate 1-19% limitation by achieving a score of 18 on the Quick DASH.                                    STATUS:  NOT MET      Recommendations: Discharge    Prognosis to achieve goals: good      OT SIGNATURE: BRIANDA Mancera, OTR/L, CHT     Electronically signed    [place] LICENSE: [id number]   DATE TREATMENT INITIATED: 12/28/2021     Timed:  Manual Therapy:    10     mins  99582;  Therapeutic Exercise:    10     mins  18783;  Therapeutic Activity:   10     mins  63229;     Neuromuscular Pa:    0    mins  69331;    Ultrasound:     0     mins  59955;    Electrical Stimulation:    0     mins  54978;    Untimed:  Electrical Stimulation:    0     mins  58346 ( );  Fluidotherapy:        0    mins  39822  Paraffin:                          0    mins  34877    Timed Treatment:   30   mins   Total Treatment:     30   mins

## 2021-12-29 ENCOUNTER — IMMUNIZATION (OUTPATIENT)
Dept: VACCINE CLINIC | Facility: HOSPITAL | Age: 51
End: 2021-12-29

## 2021-12-29 PROCEDURE — 91300 HC SARSCOV02 VAC 30MCG/0.3ML IM: CPT | Performed by: INTERNAL MEDICINE

## 2021-12-29 PROCEDURE — 0004A HC ADM SARSCOV2 30MCG/0.3ML BOOSTER: CPT | Performed by: INTERNAL MEDICINE

## 2022-01-03 ENCOUNTER — APPOINTMENT (OUTPATIENT)
Dept: VACCINE CLINIC | Facility: HOSPITAL | Age: 52
End: 2022-01-03

## 2022-01-17 ENCOUNTER — DOCUMENTATION (OUTPATIENT)
Dept: PHYSICAL THERAPY | Facility: CLINIC | Age: 52
End: 2022-01-17

## 2022-04-21 ENCOUNTER — OFFICE VISIT (OUTPATIENT)
Dept: BARIATRICS/WEIGHT MGMT | Facility: CLINIC | Age: 52
End: 2022-04-21

## 2022-04-21 VITALS
BODY MASS INDEX: 42.59 KG/M2 | TEMPERATURE: 98 F | WEIGHT: 281 LBS | DIASTOLIC BLOOD PRESSURE: 81 MMHG | HEART RATE: 53 BPM | HEIGHT: 68 IN | RESPIRATION RATE: 18 BRPM | SYSTOLIC BLOOD PRESSURE: 136 MMHG

## 2022-04-21 DIAGNOSIS — Z71.3 DIETARY COUNSELING: ICD-10-CM

## 2022-04-21 DIAGNOSIS — Z98.84 S/P LAPAROSCOPIC SLEEVE GASTRECTOMY: ICD-10-CM

## 2022-04-21 DIAGNOSIS — E66.01 OBESITY, CLASS III, BMI 40-49.9 (MORBID OBESITY): Primary | ICD-10-CM

## 2022-04-21 DIAGNOSIS — R63.5 UNINTENDED WEIGHT GAIN: ICD-10-CM

## 2022-04-21 DIAGNOSIS — R10.13 DYSPEPSIA: ICD-10-CM

## 2022-04-21 DIAGNOSIS — I10 ESSENTIAL HYPERTENSION: ICD-10-CM

## 2022-04-21 PROCEDURE — 99203 OFFICE O/P NEW LOW 30 MIN: CPT | Performed by: SURGERY

## 2022-04-21 RX ORDER — HYDROXYZINE HYDROCHLORIDE 25 MG/1
TABLET, FILM COATED ORAL
COMMUNITY
End: 2022-04-21 | Stop reason: SDUPTHER

## 2022-04-21 RX ORDER — FLUTICASONE PROPIONATE 50 MCG
SPRAY, SUSPENSION (ML) NASAL
COMMUNITY
End: 2022-04-21 | Stop reason: SDUPTHER

## 2022-04-21 RX ORDER — SODIUM CHLORIDE, SODIUM LACTATE, POTASSIUM CHLORIDE, CALCIUM CHLORIDE 600; 310; 30; 20 MG/100ML; MG/100ML; MG/100ML; MG/100ML
30 INJECTION, SOLUTION INTRAVENOUS CONTINUOUS
Status: CANCELLED | OUTPATIENT
Start: 2022-06-14

## 2022-04-21 NOTE — PATIENT INSTRUCTIONS
Bariatric Manual    You were provided a manual specific to the procedure that you have chosen.  Please refer to that with any questions or call the office at [phone] Diet Manual    You were given a manual that discusses in detail good eating tips and habits. Please read and keep with you for future. Metamucil one Tablespoon in 8 oz of water then follow with another 8 oz of water in the morning or evening

## 2022-04-21 NOTE — PROGRESS NOTES
[facility]  [address]  [facility] [address]  [phone]  [address] [facility] [address]  [facility] [id number] [phone]  Dept: [phone]  4/21/2022      Julia Spain.  [id number]  [id number]  1970  female      Chief Complaint   Patient presents with   • Consult     BETO SLEEVE       BH Post-Op Bariatric Surgery:   Julia Spain is status post Laparoscopic Sleeve procedure, performed on 6/25/13 at [facility].    HPI:   Today's weight is 127 kg (281 lb) pounds, today's BMI is Body mass index is 42.73 kg/m².. @ greatest weight loss from surgery was 128 pounds. The patient reports an unwanted weight gain of 48 pounds.  [unfilled] denies fever, chills, chest pain, SOA, melena, hematochezia, hematemesis, dysuria, frequency, hematuria, jaundice.    51-year-old female status post laparoscopic sleeve gastrectomy June 2013 in [place] who moved to [place] a few years ago.  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  She is interested in getting back on track with her weight loss.  She works third shift on 12-hour shift 5 to 6 days/week.  She went through her daily routine.  No formal exercise routine.  No major heartburn or dysphagia problems.  Rare heartburn with certain foods.      Diet and Exercise:   Diet history reviewed and discussed with the patient. Weight loss/gains to date discussed with the patient. She reports eating 3 meals per day, a typical portion size of 1 cup, eating 2 snacks per day, drinking 5 or more 8-oz. glasses of water per day, no carbonated beverage consumption and exercising regularly.     Supplements: Multivitamin    Review of Systems   Constitutional: Positive for fatigue.   Musculoskeletal: Positive for arthralgias and back pain.   All other systems reviewed and are negative.      Patient Active Problem List   Diagnosis   • Obesity, Class III, BMI 40-49.9  (morbid obesity) (HCC)   • S/P laparoscopic sleeve gastrectomy   • Dietary counseling   • Essential hypertension   • Unintended weight gain   • Dyspepsia       Past Medical History:   Diagnosis Date   • Anemia    • Anxiety    • Headache        No past surgical history on file.    Allergies   Allergen Reactions   • Dilaudid [Hydromorphone] Itching         Current Outpatient Medications:   •  ALPRAZolam (XANAX) 0.25 MG tablet, Take 0.25 mg by mouth 2 (Two) Times a Day As Needed for Anxiety., Disp: , Rfl:   •  cetirizine (zyrTEC) 10 MG tablet, Take 10 mg by mouth Daily., Disp: , Rfl:   •  Cholecalciferol 125 MCG (5000 UT) tablet, Vitamin D3 125 mcg (5,000 unit) oral tablet take 1 tablet by oral route daily   Active, Disp: , Rfl:   •  Diclofenac Sodium (VOLTAREN) 1 % gel gel, , Disp: , Rfl:   •  fluticasone (FLONASE) 50 MCG/ACT nasal spray, Instrill 1 spray into the affected nostril(s) as directed by provider Daily., Disp: 48 g, Rfl: 0  •  hydrOXYzine (ATARAX) 25 MG tablet, Take 1 tablet by mouth every night at bedtime as needed., Disp: 90 tablet, Rfl: 0  •  lisinopril-hydrochlorothiazide (PRINZIDE,ZESTORETIC) 10-12.5 MG per tablet, Take 1 tablet by mouth once daily, Disp: 90 tablet, Rfl: 0  •  metoprolol succinate XL (TOPROL-XL) 25 MG 24 hr tablet, Take 1 tablet by mouth Daily., Disp: 90 tablet, Rfl: 0  •  montelukast (SINGULAIR) 10 MG tablet, Take 1 tablet by mouth every night at bedtime., Disp: 90 tablet, Rfl: 0    Social History     Socioeconomic History   • Marital status: Single   Tobacco Use   • Smoking status: Former Smoker     Quit date:      Years since quittin.3   • Smokeless tobacco: Never Used   Substance and Sexual Activity   • Alcohol use: Yes     Comment: occassionally   • Drug use: Never       No family history on file.    The following portions of the patient's history were reviewed and updated as appropriate: allergies, current medications, past family history, past medical history, past  social history, past surgical history and problem list.    Vitals:    04/21/22 1429   BP: 136/81   Pulse: 53   Resp: 18   Temp: 98 °F (36.7 °C)       Physical Exam  Vitals reviewed.   HENT:      Head: Normocephalic and atraumatic.      Mouth/Throat:      Mouth: Mucous membranes are moist.      Pharynx: Oropharynx is clear.   Eyes:      General: No scleral icterus.     Extraocular Movements: Extraocular movements intact.      Conjunctiva/sclera: Conjunctivae normal.      Pupils: Pupils are equal, round, and reactive to light.   Neck:      Thyroid: No thyromegaly.   Cardiovascular:      Rate and Rhythm: Normal rate.   Pulmonary:      Effort: Pulmonary effort is normal. No respiratory distress.      Breath sounds: Normal breath sounds. No stridor. No wheezing or rhonchi.   Abdominal:      General: Bowel sounds are normal.      Palpations: Abdomen is soft.      Tenderness: There is no abdominal tenderness. There is no right CVA tenderness, left CVA tenderness, guarding or rebound.      Hernia: No hernia is present.   Musculoskeletal:         General: Normal range of motion.      Cervical back: Normal range of motion and neck supple.   Lymphadenopathy:      Cervical: No cervical adenopathy.   Skin:     General: Skin is warm and dry.      Findings: No erythema.   Neurological:      Mental Status: She is alert and oriented to person, place, and time.   Psychiatric:         Mood and Affect: Mood normal.         Behavior: Behavior normal.         Thought Content: Thought content normal.         Judgment: Judgment normal.           Assessment:   Julia Spain has severe obesity with multiple co-morbidities who would like to transfer her bariatric care to us and participate in our bariatric program.      Encounter Diagnoses   Name Primary?   • Obesity, Class III, BMI 40-49.9 (morbid obesity) (HCC) Yes   • S/P laparoscopic sleeve gastrectomy    • Essential hypertension    • Dietary counseling    • Unintended weight  gain    • Dyspepsia          Discussion/Summary/Plan:     51-year-old female status post laparoscopic sleeve gastrectomy June 2013 in [place] who has regained some weight back.  She would like to get back on track.  I went through her daily routine and instructed her about clean eating concentrating on lean protein vegetables and fruit.  We also discussed doing the meal replacement shakes.  Also discussed using apple cider vinegar which she is using the Gummies now.  Also discussed Metamucil.  Also discussed using our dietitian and mental counselor.  Follow-up in 1 month plan to lose 10 to 15 pounds.  She has not had any endoscopy since her surgery and she would like to proceed with upper endoscopy for further evaluation of her gastric sleeve.  The risks and benefits of the procedure were discussed with the patient in detail and all questions were answered.  Possibility of perforation, bleeding, aspiration, anoxic brain injury, respiratory and/or cardiac arrest and death were discussed.  Consent will be signed and witnessed.    Recommended patient be sure to eat at least three meals per day all with high lean protein, vegetables and fruit. Be sure to limit/cut back on daily simple carbohydrate intake. Discussed with the patient the recommended amount of water per day to intake. Reviewed vitamin requirements. Be sure to do routine exercise including both cardio and strength training. Recommended patient to see our dietician to go into more detail regarding diet changes.    Instructions / Recommendations: dietary counseling recommended, recommended a daily protein intake of  grams, vitamin supplements recommended, recommended exercising at least 150 minutes per week, behavior modifications recommended and instructed to call the office for concerns, questions, or problems.    The patient was instructed to follow up in 1 month.     The patient was counseled regarding diet, exercise and the surgical  procedures available. Our bariatric manual was given to the patient and was discussed in detail. Dietician appointment was highly recommended as well as attending support groups.  Total time of encounter was over 35 minutes counseling the patient and going over the procedure.  Dietary changes as well as exercise were also discussed.  Time was also spent before the encounter to review their history and any documentation provided..

## 2022-06-06 ENCOUNTER — TRANSCRIBE ORDERS (OUTPATIENT)
Dept: ADMINISTRATIVE | Facility: HOSPITAL | Age: 52
End: 2022-06-06

## 2022-06-06 DIAGNOSIS — Z01.818 OTHER SPECIFIED PRE-OPERATIVE EXAMINATION: Primary | ICD-10-CM

## 2022-06-11 ENCOUNTER — LAB (OUTPATIENT)
Dept: LAB | Facility: HOSPITAL | Age: 52
End: 2022-06-11

## 2022-06-11 DIAGNOSIS — Z01.818 OTHER SPECIFIED PRE-OPERATIVE EXAMINATION: ICD-10-CM

## 2022-06-11 PROCEDURE — U0004 COV-19 TEST NON-CDC HGH THRU: HCPCS

## 2022-06-11 PROCEDURE — C9803 HOPD COVID-19 SPEC COLLECT: HCPCS

## 2022-06-12 LAB — SARS-COV-2 RNA PNL SPEC NAA+PROBE: NOT DETECTED

## 2022-06-14 ENCOUNTER — HOSPITAL ENCOUNTER (OUTPATIENT)
Facility: HOSPITAL | Age: 52
Setting detail: HOSPITAL OUTPATIENT SURGERY
Discharge: HOME | End: 2022-06-14
Attending: SURGERY | Admitting: SURGERY

## 2022-06-14 ENCOUNTER — ANESTHESIA EVENT (OUTPATIENT)
Dept: GASTROENTEROLOGY | Facility: HOSPITAL | Age: 52
End: 2022-06-14

## 2022-06-14 ENCOUNTER — ANESTHESIA (OUTPATIENT)
Dept: GASTROENTEROLOGY | Facility: HOSPITAL | Age: 52
End: 2022-06-14

## 2022-06-14 VITALS
DIASTOLIC BLOOD PRESSURE: 59 MMHG | TEMPERATURE: 97.8 F | SYSTOLIC BLOOD PRESSURE: 114 MMHG | RESPIRATION RATE: 16 BRPM | HEIGHT: 68 IN | BODY MASS INDEX: 41.52 KG/M2 | HEART RATE: 69 BPM | WEIGHT: 274 LBS | OXYGEN SATURATION: 100 %

## 2022-06-14 DIAGNOSIS — Z71.3 DIETARY COUNSELING: ICD-10-CM

## 2022-06-14 DIAGNOSIS — E66.01 OBESITY, CLASS III, BMI 40-49.9 (MORBID OBESITY): ICD-10-CM

## 2022-06-14 DIAGNOSIS — R10.13 DYSPEPSIA: ICD-10-CM

## 2022-06-14 DIAGNOSIS — Z98.84 S/P LAPAROSCOPIC SLEEVE GASTRECTOMY: ICD-10-CM

## 2022-06-14 DIAGNOSIS — I10 ESSENTIAL HYPERTENSION: ICD-10-CM

## 2022-06-14 DIAGNOSIS — R63.5 UNINTENDED WEIGHT GAIN: ICD-10-CM

## 2022-06-14 LAB
B-HCG UR QL: NEGATIVE
EXPIRATION DATE: NORMAL
INTERNAL NEGATIVE CONTROL: NEGATIVE
INTERNAL POSITIVE CONTROL: POSITIVE
Lab: NORMAL

## 2022-06-14 PROCEDURE — 87081 CULTURE SCREEN ONLY: CPT | Performed by: SURGERY

## 2022-06-14 PROCEDURE — 81025 URINE PREGNANCY TEST: CPT | Performed by: SURGERY

## 2022-06-14 PROCEDURE — 25010000002 PROPOFOL 10 MG/ML EMULSION: Performed by: ANESTHESIOLOGY

## 2022-06-14 PROCEDURE — 43239 EGD BIOPSY SINGLE/MULTIPLE: CPT | Performed by: SURGERY

## 2022-06-14 RX ORDER — LIDOCAINE HYDROCHLORIDE 10 MG/ML
0.5 INJECTION, SOLUTION INFILTRATION; PERINEURAL ONCE AS NEEDED
Status: DISCONTINUED | OUTPATIENT
Start: 2022-06-14 | End: 2022-06-14 | Stop reason: HOSPADM

## 2022-06-14 RX ORDER — SODIUM CHLORIDE, SODIUM LACTATE, POTASSIUM CHLORIDE, CALCIUM CHLORIDE 600; 310; 30; 20 MG/100ML; MG/100ML; MG/100ML; MG/100ML
1000 INJECTION, SOLUTION INTRAVENOUS CONTINUOUS
Status: DISCONTINUED | OUTPATIENT
Start: 2022-06-14 | End: 2022-06-14 | Stop reason: HOSPADM

## 2022-06-14 RX ORDER — GLYCOPYRROLATE 0.2 MG/ML
INJECTION INTRAMUSCULAR; INTRAVENOUS AS NEEDED
Status: DISCONTINUED | OUTPATIENT
Start: 2022-06-14 | End: 2022-06-14 | Stop reason: SURG

## 2022-06-14 RX ORDER — SODIUM CHLORIDE 0.9 % (FLUSH) 0.9 %
10 SYRINGE (ML) INJECTION AS NEEDED
Status: DISCONTINUED | OUTPATIENT
Start: 2022-06-14 | End: 2022-06-14 | Stop reason: HOSPADM

## 2022-06-14 RX ORDER — LIDOCAINE HYDROCHLORIDE 20 MG/ML
INJECTION, SOLUTION INFILTRATION; PERINEURAL AS NEEDED
Status: DISCONTINUED | OUTPATIENT
Start: 2022-06-14 | End: 2022-06-14 | Stop reason: SURG

## 2022-06-14 RX ORDER — SODIUM CHLORIDE, SODIUM LACTATE, POTASSIUM CHLORIDE, CALCIUM CHLORIDE 600; 310; 30; 20 MG/100ML; MG/100ML; MG/100ML; MG/100ML
30 INJECTION, SOLUTION INTRAVENOUS CONTINUOUS
Status: DISCONTINUED | OUTPATIENT
Start: 2022-06-14 | End: 2022-06-14 | Stop reason: HOSPADM

## 2022-06-14 RX ORDER — PROPOFOL 10 MG/ML
VIAL (ML) INTRAVENOUS CONTINUOUS PRN
Status: DISCONTINUED | OUTPATIENT
Start: 2022-06-14 | End: 2022-06-14 | Stop reason: SURG

## 2022-06-14 RX ORDER — PROPOFOL 10 MG/ML
VIAL (ML) INTRAVENOUS AS NEEDED
Status: DISCONTINUED | OUTPATIENT
Start: 2022-06-14 | End: 2022-06-14 | Stop reason: SURG

## 2022-06-14 RX ADMIN — SODIUM CHLORIDE, POTASSIUM CHLORIDE, SODIUM LACTATE AND CALCIUM CHLORIDE 1000 ML: 600; 310; 30; 20 INJECTION, SOLUTION INTRAVENOUS at 09:20

## 2022-06-14 RX ADMIN — PROPOFOL 200 MG: 10 INJECTION, EMULSION INTRAVENOUS at 10:02

## 2022-06-14 RX ADMIN — LIDOCAINE HYDROCHLORIDE 60 MG: 20 INJECTION, SOLUTION INFILTRATION; PERINEURAL at 10:02

## 2022-06-14 RX ADMIN — GLYCOPYRROLATE 0.2 MG: 0.2 INJECTION INTRAMUSCULAR; INTRAVENOUS at 10:02

## 2022-06-14 RX ADMIN — Medication 200 MCG/KG/MIN: at 10:02

## 2022-06-14 NOTE — DISCHARGE INSTRUCTIONS
For the next 24 hours patient needs to be with a responsible adult.    For 24 hours DO NOT drive, operate machinery, appliances, drink alcohol, make important decisions or sign legal documents.    Start with a light or bland diet if you are feeling sick to your stomach otherwise advance to regular diet as tolerated.    Follow recommendations on procedure report if provided by your doctor.    Call Dr SHEN for problems [phone]    Problems may include but not limited to: large amounts of bleeding, trouble breathing, repeated vomiting, severe unrelieved pain, fever or chills.

## 2022-06-14 NOTE — ANESTHESIA PREPROCEDURE EVALUATION
Anesthesia Evaluation     Patient summary reviewed and Nursing notes reviewed                Airway   Mallampati: III  TM distance: >3 FB  Neck ROM: full  Possible difficult intubation  Dental - normal exam     Pulmonary - normal exam   (+) a smoker Former,   Cardiovascular     Rhythm: regular  Rate: abnormal    (+) hypertension 2 medications or greater,     PE comment: SB/rate 50    Neuro/Psych  (+) headaches, psychiatric history Anxiety and Depression,    GI/Hepatic/Renal/Endo    (+) obesity, morbid obesity, hiatal hernia,      ROS Comment: Hx gastric sleeve    Musculoskeletal     Abdominal   (+) obese,    Substance History      OB/GYN          Other                      Anesthesia Plan    ASA 3     MAC     intravenous induction     Anesthetic plan, risks, benefits, and alternatives have been provided, discussed and informed consent has been obtained with: patient.        CODE STATUS:

## 2022-06-14 NOTE — H&P
[facility]  [address]  [facility] [address]  [phone]  [address] [facility] [address]  [facility] [address]  [phone]  Dept: [phone]  4/21/2022        Julia Spain.  [id number]  [id number]  1970  female             Chief Complaint   Patient presents with   • Consult       BETO SLEEVE         BH Post-Op Bariatric Surgery:   Julia Spain is status post Laparoscopic Sleeve procedure, performed on 6/25/13 at [facility].     HPI:   Today's weight is 127 kg (281 lb) pounds, today's BMI is Body mass index is 42.73 kg/m².. @ greatest weight loss from surgery was 128 pounds. The patient reports an unwanted weight gain of 48 pounds.  [unfilled] denies fever, chills, chest pain, SOA, melena, hematochezia, hematemesis, dysuria, frequency, hematuria, jaundice.     51-year-old female status post laparoscopic sleeve gastrectomy June 2013 in [place] who moved to [place] a few years ago.  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  She is interested in getting back on track with her weight loss.  She works third shift on 12-hour shift 5 to 6 days/week.  She went through her daily routine.  No formal exercise routine.  No major heartburn or dysphagia problems.  Rare heartburn with certain foods.        Diet and Exercise:   Diet history reviewed and discussed with the patient. Weight loss/gains to date discussed with the patient. She reports eating 3 meals per day, a typical portion size of 1 cup, eating 2 snacks per day, drinking 5 or more 8-oz. glasses of water per day, no carbonated beverage consumption and exercising regularly.      Supplements: Multivitamin     Review of Systems   Constitutional: Positive for fatigue.   Musculoskeletal: Positive for arthralgias and back pain.   All other systems reviewed and are negative.            Patient Active Problem List   Diagnosis   • Obesity,  Class III, BMI 40-49.9 (morbid obesity) (HCC)   • S/P laparoscopic sleeve gastrectomy   • Dietary counseling   • Essential hypertension   • Unintended weight gain   • Dyspepsia         Medical History        Past Medical History:   Diagnosis Date   • Anemia     • Anxiety     • Headache              Surgical History   No past surgical history on file.             Allergies   Allergen Reactions   • Dilaudid [Hydromorphone] Itching            Current Outpatient Medications:   •  ALPRAZolam (XANAX) 0.25 MG tablet, Take 0.25 mg by mouth 2 (Two) Times a Day As Needed for Anxiety., Disp: , Rfl:   •  cetirizine (zyrTEC) 10 MG tablet, Take 10 mg by mouth Daily., Disp: , Rfl:   •  Cholecalciferol 125 MCG (5000 UT) tablet, Vitamin D3 125 mcg (5,000 unit) oral tablet take 1 tablet by oral route daily   Active, Disp: , Rfl:   •  Diclofenac Sodium (VOLTAREN) 1 % gel gel, , Disp: , Rfl:   •  fluticasone (FLONASE) 50 MCG/ACT nasal spray, Instrill 1 spray into the affected nostril(s) as directed by provider Daily., Disp: 48 g, Rfl: 0  •  hydrOXYzine (ATARAX) 25 MG tablet, Take 1 tablet by mouth every night at bedtime as needed., Disp: 90 tablet, Rfl: 0  •  lisinopril-hydrochlorothiazide (PRINZIDE,ZESTORETIC) 10-12.5 MG per tablet, Take 1 tablet by mouth once daily, Disp: 90 tablet, Rfl: 0  •  metoprolol succinate XL (TOPROL-XL) 25 MG 24 hr tablet, Take 1 tablet by mouth Daily., Disp: 90 tablet, Rfl: 0  •  montelukast (SINGULAIR) 10 MG tablet, Take 1 tablet by mouth every night at bedtime., Disp: 90 tablet, Rfl: 0     Social History   Social History            Socioeconomic History   • Marital status: Single   Tobacco Use   • Smoking status: Former Smoker       Quit date:        Years since quittin.3   • Smokeless tobacco: Never Used   Substance and Sexual Activity   • Alcohol use: Yes       Comment: occassionally   • Drug use: Never            No family history on file.     The following portions of the patient's history  were reviewed and updated as appropriate: allergies, current medications, past family history, past medical history, past social history, past surgical history and problem list.         Vitals:     04/21/22 1429   BP: 136/81   Pulse: 53   Resp: 18   Temp: 98 °F (36.7 °C)         Physical Exam  Vitals reviewed.   HENT:      Head: Normocephalic and atraumatic.      Mouth/Throat:      Mouth: Mucous membranes are moist.      Pharynx: Oropharynx is clear.   Eyes:      General: No scleral icterus.     Extraocular Movements: Extraocular movements intact.      Conjunctiva/sclera: Conjunctivae normal.      Pupils: Pupils are equal, round, and reactive to light.   Neck:      Thyroid: No thyromegaly.   Cardiovascular:      Rate and Rhythm: Normal rate.   Pulmonary:      Effort: Pulmonary effort is normal. No respiratory distress.      Breath sounds: Normal breath sounds. No stridor. No wheezing or rhonchi.   Abdominal:      General: Bowel sounds are normal.      Palpations: Abdomen is soft.      Tenderness: There is no abdominal tenderness. There is no right CVA tenderness, left CVA tenderness, guarding or rebound.      Hernia: No hernia is present.   Musculoskeletal:         General: Normal range of motion.      Cervical back: Normal range of motion and neck supple.   Lymphadenopathy:      Cervical: No cervical adenopathy.   Skin:     General: Skin is warm and dry.      Findings: No erythema.   Neurological:      Mental Status: She is alert and oriented to person, place, and time.   Psychiatric:         Mood and Affect: Mood normal.         Behavior: Behavior normal.         Thought Content: Thought content normal.         Judgment: Judgment normal.               Assessment:   Julia Spain has severe obesity with multiple co-morbidities who would like to transfer her bariatric care to us and participate in our bariatric program.               Encounter Diagnoses   Name Primary?   • Obesity, Class III, BMI 40-49.9  (morbid obesity) (HCC) Yes   • S/P laparoscopic sleeve gastrectomy     • Essential hypertension     • Dietary counseling     • Unintended weight gain     • Dyspepsia              Discussion/Summary/Plan:      51-year-old female status post laparoscopic sleeve gastrectomy June 2013 in [place] who has regained some weight back.  She would like to get back on track.  I went through her daily routine and instructed her about clean eating concentrating on lean protein vegetables and fruit.  We also discussed doing the meal replacement shakes.  Also discussed using apple cider vinegar which she is using the Gummies now.  Also discussed Metamucil.  Also discussed using our dietitian and mental counselor.  Follow-up in 1 month plan to lose 10 to 15 pounds.  She has not had any endoscopy since her surgery and she would like to proceed with upper endoscopy for further evaluation of her gastric sleeve.  The risks and benefits of the procedure were discussed with the patient in detail and all questions were answered.  Possibility of perforation, bleeding, aspiration, anoxic brain injury, respiratory and/or cardiac arrest and death were discussed.  Consent will be signed and witnessed.     Recommended patient be sure to eat at least three meals per day all with high lean protein, vegetables and fruit. Be sure to limit/cut back on daily simple carbohydrate intake. Discussed with the patient the recommended amount of water per day to intake. Reviewed vitamin requirements. Be sure to do routine exercise including both cardio and strength training. Recommended patient to see our dietician to go into more detail regarding diet changes.     Instructions / Recommendations: dietary counseling recommended, recommended a daily protein intake of  grams, vitamin supplements recommended, recommended exercising at least 150 minutes per week, behavior modifications recommended and instructed to call the office for concerns,  questions, or problems.     The patient was instructed to follow up in 1 month.      The patient was counseled regarding diet, exercise and the surgical procedures available. Our bariatric manual was given to the patient and was discussed in detail. Dietician appointment was highly recommended as well as attending support groups.  Total time of encounter was over 35 minutes counseling the patient and going over the procedure.  Dietary changes as well as exercise were also discussed.  Time was also spent before the encounter to review their history and any documentation provided..

## 2022-06-14 NOTE — OP NOTE
Surgeon: Royce Solorio Jr., M.D.    Preoperative Diagnosis: #1 dyspepsia #2 status post laparoscopic sleeve gastrectomy in [place]    Postoperative Diagnosis: Gastritis    Procedure Performed: Transoral esophagogastroduodenoscopy with gastric antral biopsies    Indications: 51-year-old female status post laparoscopic sleeve gastrectomy [place] with some weight regain.    Procedure:     The procedure, indications, preparation and potential complications were explained to the patient, who indicated understanding and signed the corresponding consent forms.  The patient was identified, taken to the endoscopy suite, and placed on the left side down decubitus position.  The patient underwent a MAC anesthesia and was appropriately monitored through the case by the anesthesia personnel with continuous pulse oximetry, blood pressure, and cardiac monitoring.  A bite block was placed.  After adequate IV sedation and using a transoral technique a lubed flexible endoscope was placed in the hypopharynx and advanced to the second portion of the duodenum without difficulty. The scope was then withdrawn back into the antrum of the stomach.  Cold forcep biopsies of the antrum were taken to rule out Helicobacter pylori.  The scope was retroflexed noting the body, fundus and cardia.  The scope was then withdrawn back into the esophagus after decompressing the stomach.  The Z line was noted and GE junction measured from the incisors.  The scope was then completely withdrawn.  The patient tolerated the procedure well and left the endoscopy suite in stable condition.  The findings are listed below.    Duodenum: Unremarkable  Antrum: Moderate patchy erythema throughout  Body/Fundus: Consistent with gastric sleeve without stricture or dilatation  Cardia: Slight amount of retained fundus  Esophagus: Z-line regular, no erosive esophagitis    Recommendations:     Await biopsy results and follow-up in the office.  Continue with  getting back on track with her weight loss.  Stop any nonsteroidal medication.

## 2022-06-14 NOTE — ANESTHESIA POSTPROCEDURE EVALUATION
Patient: Julia Spain    Procedure Summary     Date: 06/14/22 Room / Location:  [facility] /  [facility]    Anesthesia Start: 0957 Anesthesia Stop: 1012    Procedure: ESOPHAGOGASTRODUODENOSCOPY WITH BIOPSY (N/A Esophagus) Diagnosis:       Obesity, Class III, BMI 40-49.9 (morbid obesity) ([facility])      S/P laparoscopic sleeve gastrectomy      Essential hypertension      Dietary counseling      Unintended weight gain      Dyspepsia      (Obesity, Class III, BMI 40-49.9 (morbid obesity) ([facility]) [E66.01])      (S/P laparoscopic sleeve gastrectomy [Z98.84])      (Essential hypertension [I10])      (Dietary counseling [Z71.3])      (Unintended weight gain [R63.5])      (Dyspepsia [R10.13])    Surgeons: Royce Solorio Jr., MD Provider: Juan Carlos Cote MD    Anesthesia Type: MAC ASA Status: 3          Anesthesia Type: MAC    Vitals  No vitals data found for the desired time range.          Post Anesthesia Care and Evaluation    Patient location during evaluation: PHASE II  Patient participation: complete - patient participated  Level of consciousness: awake and alert  Pain management: adequate    Airway patency: patent  Anesthetic complications: No anesthetic complications  PONV Status: none  Cardiovascular status: acceptable and hemodynamically stable  Respiratory status: acceptable, nonlabored ventilation and spontaneous ventilation  Hydration status: acceptable

## 2022-06-15 ENCOUNTER — OFFICE VISIT (OUTPATIENT)
Dept: ORTHOPEDIC SURGERY | Facility: CLINIC | Age: 52
End: 2022-06-15

## 2022-06-15 VITALS — HEART RATE: 74 BPM | OXYGEN SATURATION: 97 % | BODY MASS INDEX: 42.47 KG/M2 | HEIGHT: 68 IN | WEIGHT: 280.2 LBS

## 2022-06-15 DIAGNOSIS — M25.511 RIGHT SHOULDER PAIN, UNSPECIFIED CHRONICITY: Primary | ICD-10-CM

## 2022-06-15 LAB — UREASE TISS QL: NEGATIVE

## 2022-06-15 PROCEDURE — 99213 OFFICE O/P EST LOW 20 MIN: CPT | Performed by: ORTHOPAEDIC SURGERY

## 2022-06-15 NOTE — PROGRESS NOTES
"Chief Complaint  Follow-up of the Right Arm     Subjective      Julia Spain presents to [facility] for a follow-up of right arm. She has a strain of the elbow at work and has done therapy for this. However, through therapy she started developing pain in the right shoulder. She reports pain and difficulty with shoulder range of motion. She has had no specific injury to the shoulder. Pain in the shoulder has been ongoing for several months.     Allergies   Allergen Reactions   • Dilaudid [Hydromorphone] Itching        Social History     Socioeconomic History   • Marital status: Single   Tobacco Use   • Smoking status: Former Smoker     Quit date:      Years since quittin.4   • Smokeless tobacco: Never Used   Vaping Use   • Vaping Use: Never used   Substance and Sexual Activity   • Alcohol use: Yes     Comment: MONTHLY   • Drug use: Never   • Sexual activity: Defer        Review of Systems     Objective   Vital Signs:   Pulse 74   Ht 172.7 cm (68\")   Wt 127 kg (280 lb 3.2 oz)   SpO2 97%   BMI 42.60 kg/m²       Physical Exam  Constitutional:       Appearance: Normal appearance. Patient is well-developed and normal weight.   HENT:      Head: Normocephalic.      Right Ear: Hearing and external ear normal.      Left Ear: Hearing and external ear normal.      Nose: Nose normal.   Eyes:      Conjunctiva/sclera: Conjunctivae normal.   Cardiovascular:      Rate and Rhythm: Normal rate.   Pulmonary:      Effort: Pulmonary effort is normal.      Breath sounds: No wheezing or rales.   Abdominal:      Palpations: Abdomen is soft.      Tenderness: There is no abdominal tenderness.   Musculoskeletal:      Cervical back: Normal range of motion.   Skin:     Findings: No rash.   Neurological:      Mental Status: Patient  is alert and oriented to person, place, and time.   Psychiatric:         Mood and Affect: Mood and affect normal.         Judgment: Judgment normal.       Ortho " Exam      RIGHT ARM: 3/5 rtc testing. Forward elevation to 150 degrees. IR to back pocket. Abduction to 90 degrees. Good tone of deltoid, biceps, triceps, wrist extensors, and wrist flexors.  No swelling, skin discoloration or atrophy.       Procedures        Imaging Results (Most Recent)     Procedure Component Value Units Date/Time    XR Scapula Right [[id number]] Resulted: 06/15/22 0813     Updated: 06/15/22 0813           Result Review :     X-Ray Report:  Right shoulder(s) X-Ray  Indication: Evaluation of right shoulder pain   AP and Lateral view(s)  Findings: No acute fractures or dislocation. No significant degenerative changes.   Prior studies available for comparison: no     Assessment and Plan     Diagnoses and all orders for this visit:    1. Right shoulder pain, unspecified chronicity (Primary)  -     XR Scapula Right        Discussed Mri vs injections today. Patient opted for an MRI.     Call or return if worsening symptoms.    Follow Up     After MRI.       Patient was given instructions and counseling regarding her condition or for health maintenance advice. Please see specific information pulled into the AVS if appropriate.     Scribed for Carlos Manuel Tyler MD by Tracy Robison.  06/15/22   08:30 EDT      I have personally performed the services described in this document as scribed by the above individual and it is both accurate and complete. Carlos Manuel Tyler MD 06/15/22

## 2022-06-20 ENCOUNTER — TELEPHONE (OUTPATIENT)
Dept: BARIATRICS/WEIGHT MGMT | Facility: CLINIC | Age: 52
End: 2022-06-20

## 2022-06-20 NOTE — TELEPHONE ENCOUNTER
Inform about EGD  ----- Message from Royce Solorio Jr., MD sent at 6/15/2022  1:49 PM EDT -----  Please call patient with negative results.

## 2022-08-05 ENCOUNTER — HOSPITAL ENCOUNTER (OUTPATIENT)
Dept: MRI IMAGING | Facility: HOSPITAL | Age: 52
Discharge: HOME | End: 2022-08-05
Admitting: ORTHOPAEDIC SURGERY

## 2022-08-05 DIAGNOSIS — M25.511 RIGHT SHOULDER PAIN, UNSPECIFIED CHRONICITY: ICD-10-CM

## 2022-08-05 PROCEDURE — 73221 MRI JOINT UPR EXTREM W/O DYE: CPT

## 2022-08-10 ENCOUNTER — OFFICE VISIT (OUTPATIENT)
Dept: ORTHOPEDIC SURGERY | Facility: CLINIC | Age: 52
End: 2022-08-10

## 2022-08-10 VITALS — WEIGHT: 280 LBS | BODY MASS INDEX: 42.44 KG/M2 | HEIGHT: 68 IN | HEART RATE: 82 BPM | OXYGEN SATURATION: 97 %

## 2022-08-10 DIAGNOSIS — M75.111 INCOMPLETE TEAR OF RIGHT ROTATOR CUFF, UNSPECIFIED WHETHER TRAUMATIC: ICD-10-CM

## 2022-08-10 DIAGNOSIS — M25.511 RIGHT SHOULDER PAIN, UNSPECIFIED CHRONICITY: Primary | ICD-10-CM

## 2022-08-10 PROCEDURE — 20610 DRAIN/INJ JOINT/BURSA W/O US: CPT | Performed by: ORTHOPAEDIC SURGERY

## 2022-08-10 PROCEDURE — 99213 OFFICE O/P EST LOW 20 MIN: CPT | Performed by: ORTHOPAEDIC SURGERY

## 2022-08-10 RX ORDER — TRIAMCINOLONE ACETONIDE 40 MG/ML
40 INJECTION, SUSPENSION INTRA-ARTICULAR; INTRAMUSCULAR
Status: COMPLETED | OUTPATIENT
Start: 2022-08-10 | End: 2022-08-10

## 2022-08-10 RX ORDER — LIDOCAINE HYDROCHLORIDE 10 MG/ML
9 INJECTION, SOLUTION INFILTRATION; PERINEURAL
Status: COMPLETED | OUTPATIENT
Start: 2022-08-10 | End: 2022-08-10

## 2022-08-10 RX ADMIN — TRIAMCINOLONE ACETONIDE 40 MG: 40 INJECTION, SUSPENSION INTRA-ARTICULAR; INTRAMUSCULAR at 08:13

## 2022-08-10 RX ADMIN — LIDOCAINE HYDROCHLORIDE 9 ML: 10 INJECTION, SOLUTION INFILTRATION; PERINEURAL at 08:13

## 2022-08-10 NOTE — PROGRESS NOTES
"Chief Complaint  Follow-up of the Right Shoulder     Subjective      Julia Spain presents to [facility] for a follow-up of right shoulder. She has a strain of the elbow at work and has done therapy for this. However, through therapy she started developing pain in the right shoulder. She reports pain and difficulty with shoulder range of motion. She has had no specific injury to the shoulder. Pain in the shoulder has been ongoing for several months. She is present today with MRI results of the right shoulder.     Allergies   Allergen Reactions   • Dilaudid [Hydromorphone] Itching        Social History     Socioeconomic History   • Marital status: Single   Tobacco Use   • Smoking status: Former Smoker     Quit date:      Years since quittin.6   • Smokeless tobacco: Never Used   Vaping Use   • Vaping Use: Never used   Substance and Sexual Activity   • Alcohol use: Yes     Comment: MONTHLY   • Drug use: Never   • Sexual activity: Defer        Review of Systems     Objective   Vital Signs:   Pulse 82   Ht 172.7 cm (68\")   Wt 127 kg (280 lb)   SpO2 97%   BMI 42.57 kg/m²       Physical Exam  Constitutional:       Appearance: Normal appearance. Patient is well-developed and normal weight.   HENT:      Head: Normocephalic.      Right Ear: Hearing and external ear normal.      Left Ear: Hearing and external ear normal.      Nose: Nose normal.   Eyes:      Conjunctiva/sclera: Conjunctivae normal.   Cardiovascular:      Rate and Rhythm: Normal rate.   Pulmonary:      Effort: Pulmonary effort is normal.      Breath sounds: No wheezing or rales.   Abdominal:      Palpations: Abdomen is soft.      Tenderness: There is no abdominal tenderness.   Musculoskeletal:      Cervical back: Normal range of motion.   Skin:     Findings: No rash.   Neurological:      Mental Status: Patient  is alert and oriented to person, place, and time.   Psychiatric:         Mood and Affect: Mood and " affect normal.         Judgment: Judgment normal.       Ortho Exam      RIGHT SHOULDER: Tender biceps. Tender about the supraspinatus tendon. Pain with empty can testing. Passive forward elevation to 170 degrees. Abduction to 90 degrees.  Sensation grossly intact. Neurovascular intact.  Radial pulse 2+, ulnar pulse 2+. No swelling, skin discoloration or atrophy. Good tone of deltoid, biceps, triceps, wrist extensors, and wrist flexors.        Large Joint Arthrocentesis: Right Shoulder  Date/Time: 8/10/2022 8:13 AM  Consent given by: patient  Site marked: site marked  Timeout: Immediately prior to procedure a time out was called to verify the correct patient, procedure, equipment, support staff and site/side marked as required   Supporting Documentation  Indications: pain   Procedure Details  Location: shoulder (right shoulder) -   Needle size: 22 G  Medications administered: 9 mL lidocaine 1 %; 40 mg triamcinolone acetonide 40 MG/ML  Patient tolerance: patient tolerated the procedure well with no immediate complications              Imaging Results (Most Recent)     None           Result Review :       MRI Shoulder Right Without Contrast    Result Date: 8/5/2022  Narrative: PROCEDURE: MRI SHOULDER RIGHT WO CONTRAST  COMPARISON: [facility] , CR, XR SCAPULA RIGHT, 6/15/2022, 8:17.  INDICATIONS: RIGHT SHOULDER PAIN WITH PAINFUL RANGE OF MOTION REACHING OVERHEAD      TECHNIQUE: A variety of imaging planes and parameters were utilized for visualization of suspected pathology.  Images were performed without contrast.   FINDINGS:  There is mild to moderate marrow edema in the humeral greater tuberosity, probably reactive in etiology.  No fracture or malalignment is seen.  Mild to moderate acromioclavicular osteoarthrosis is noted.  No AC joint effusion is noted.  A type 2 acromion is present.  Mild subacromial spurring is present.  There is an intrasubstance tear in the distal supraspinatus tendon at the footprint.   The tear measures 1.0 cm AP.  The tear extends near to both the articular and bursal surfaces.  Mild infra spinatus tendinopathy is noted.  The rotator cuff otherwise appears unremarkable.  No muscle body atrophy is seen.   A small amount of fluid is noted surrounding the biceps tendon suggesting tenosynovitis.  The tendon and its insertion onto the superior labrum appear intact .  No labral tear is seen.  No glenohumeral joint effusion is noted.  No loose body is seen.  Cartilage in the glenohumeral joint appears intact.      Impression:   1. Intrasubstance tear of the distal supraspinatus tendon at the footprint 2. Mild infra spinatus tendinopathy 3. Mild bicipital tenosynovitis 4. Likely reactive marrow edema in the humeral greater tuberosity.  An osseous contusion would also be in the differential in the setting of previous trauma 5. Mild to moderate acromioclavicular osteoarthrosis      Kevin Limon M.D.       Electronically Signed and Approved By: Kevin Limon M.D. on 8/05/2022 at 13:03                      Assessment and Plan     Diagnoses and all orders for this visit:    1. Right shoulder pain, unspecified chronicity (Primary)    2. Incomplete tear of right rotator cuff, unspecified whether traumatic        Discussed trying a cortisone injection today. Patient agrees with this, she wants to avoid surgery if she can. She tolerated right shoulder injection well.     Call or return if worsening symptoms.    Follow Up     PRN    Patient was given instructions and counseling regarding her condition or for health maintenance advice. Please see specific information pulled into the AVS if appropriate.     Scribed for Carlos Manuel Tyler MD by Tracy Robison.  08/10/22   08:05 EDT    I have personally performed the services described in this document as scribed by the above individual and it is both accurate and complete. Carlos Manuel Tyler MD 08/10/22

## 2022-10-30 ENCOUNTER — APPOINTMENT (OUTPATIENT)
Dept: GENERAL RADIOLOGY | Facility: HOSPITAL | Age: 52
End: 2022-10-30

## 2022-10-30 ENCOUNTER — HOSPITAL ENCOUNTER (EMERGENCY)
Facility: HOSPITAL | Age: 52
Discharge: HOME | End: 2022-10-30
Attending: EMERGENCY MEDICINE | Admitting: EMERGENCY MEDICINE

## 2022-10-30 VITALS
BODY MASS INDEX: 41.2 KG/M2 | HEIGHT: 68 IN | OXYGEN SATURATION: 99 % | DIASTOLIC BLOOD PRESSURE: 89 MMHG | SYSTOLIC BLOOD PRESSURE: 121 MMHG | WEIGHT: 271.83 LBS | HEART RATE: 50 BPM | TEMPERATURE: 97.7 F | RESPIRATION RATE: 20 BRPM

## 2022-10-30 DIAGNOSIS — R00.2 PALPITATIONS: Primary | ICD-10-CM

## 2022-10-30 DIAGNOSIS — E87.6 HYPOKALEMIA: ICD-10-CM

## 2022-10-30 LAB
ALBUMIN SERPL-MCNC: 4.1 G/DL (ref 3.5–5.2)
ALBUMIN/GLOB SERPL: 0.9 G/DL
ALP SERPL-CCNC: 107 U/L (ref 39–117)
ALT SERPL W P-5'-P-CCNC: 13 U/L (ref 1–33)
ANION GAP SERPL CALCULATED.3IONS-SCNC: 16 MMOL/L (ref 5–15)
AST SERPL-CCNC: 15 U/L (ref 1–32)
BASOPHILS # BLD AUTO: 0.02 10*3/MM3 (ref 0–0.2)
BASOPHILS NFR BLD AUTO: 0.2 % (ref 0–1.5)
BILIRUB SERPL-MCNC: 0.7 MG/DL (ref 0–1.2)
BUN SERPL-MCNC: 21 MG/DL (ref 6–20)
BUN/CREAT SERPL: 25.9 (ref 7–25)
CALCIUM SPEC-SCNC: 10 MG/DL (ref 8.6–10.5)
CHLORIDE SERPL-SCNC: 105 MMOL/L (ref 98–107)
CO2 SERPL-SCNC: 19 MMOL/L (ref 22–29)
CREAT SERPL-MCNC: 0.81 MG/DL (ref 0.57–1)
DEPRECATED RDW RBC AUTO: 47 FL (ref 37–54)
EGFRCR SERPLBLD CKD-EPI 2021: 86.9 ML/MIN/1.73
EOSINOPHIL # BLD AUTO: 0.03 10*3/MM3 (ref 0–0.4)
EOSINOPHIL NFR BLD AUTO: 0.3 % (ref 0.3–6.2)
ERYTHROCYTE [DISTWIDTH] IN BLOOD BY AUTOMATED COUNT: 15.9 % (ref 12.3–15.4)
GLOBULIN UR ELPH-MCNC: 4.4 GM/DL
GLUCOSE SERPL-MCNC: 104 MG/DL (ref 65–99)
HCT VFR BLD AUTO: 36.9 % (ref 34–46.6)
HGB BLD-MCNC: 11.8 G/DL (ref 12–15.9)
HOLD SPECIMEN: NORMAL
HOLD SPECIMEN: NORMAL
IMM GRANULOCYTES # BLD AUTO: 0.02 10*3/MM3 (ref 0–0.05)
IMM GRANULOCYTES NFR BLD AUTO: 0.2 % (ref 0–0.5)
LIPASE SERPL-CCNC: 20 U/L (ref 13–60)
LYMPHOCYTES # BLD AUTO: 2.71 10*3/MM3 (ref 0.7–3.1)
LYMPHOCYTES NFR BLD AUTO: 31.5 % (ref 19.6–45.3)
MAGNESIUM SERPL-MCNC: 1.7 MG/DL (ref 1.6–2.6)
MCH RBC QN AUTO: 26 PG (ref 26.6–33)
MCHC RBC AUTO-ENTMCNC: 32 G/DL (ref 31.5–35.7)
MCV RBC AUTO: 81.5 FL (ref 79–97)
MONOCYTES # BLD AUTO: 0.53 10*3/MM3 (ref 0.1–0.9)
MONOCYTES NFR BLD AUTO: 6.2 % (ref 5–12)
NEUTROPHILS NFR BLD AUTO: 5.29 10*3/MM3 (ref 1.7–7)
NEUTROPHILS NFR BLD AUTO: 61.6 % (ref 42.7–76)
NRBC BLD AUTO-RTO: 0 /100 WBC (ref 0–0.2)
NT-PROBNP SERPL-MCNC: 33.8 PG/ML (ref 0–900)
PLATELET # BLD AUTO: 312 10*3/MM3 (ref 140–450)
PMV BLD AUTO: 9.8 FL (ref 6–12)
POTASSIUM SERPL-SCNC: 3.4 MMOL/L (ref 3.5–5.2)
PROT SERPL-MCNC: 8.5 G/DL (ref 6–8.5)
RBC # BLD AUTO: 4.53 10*6/MM3 (ref 3.77–5.28)
SODIUM SERPL-SCNC: 140 MMOL/L (ref 136–145)
TROPONIN I SERPL-MCNC: 0 NG/ML (ref 0–0.08)
TROPONIN I SERPL-MCNC: 0 NG/ML (ref 0–0.08)
WBC NRBC COR # BLD: 8.6 10*3/MM3 (ref 3.4–10.8)
WHOLE BLOOD HOLD COAG: NORMAL
WHOLE BLOOD HOLD SPECIMEN: NORMAL

## 2022-10-30 PROCEDURE — 80053 COMPREHEN METABOLIC PANEL: CPT

## 2022-10-30 PROCEDURE — 36415 COLL VENOUS BLD VENIPUNCTURE: CPT | Performed by: EMERGENCY MEDICINE

## 2022-10-30 PROCEDURE — 83735 ASSAY OF MAGNESIUM: CPT

## 2022-10-30 PROCEDURE — 93005 ELECTROCARDIOGRAM TRACING: CPT | Performed by: EMERGENCY MEDICINE

## 2022-10-30 PROCEDURE — 71045 X-RAY EXAM CHEST 1 VIEW: CPT

## 2022-10-30 PROCEDURE — 93005 ELECTROCARDIOGRAM TRACING: CPT

## 2022-10-30 PROCEDURE — 93010 ELECTROCARDIOGRAM REPORT: CPT | Performed by: INTERNAL MEDICINE

## 2022-10-30 PROCEDURE — 96374 THER/PROPH/DIAG INJ IV PUSH: CPT

## 2022-10-30 PROCEDURE — 83880 ASSAY OF NATRIURETIC PEPTIDE: CPT

## 2022-10-30 PROCEDURE — 85025 COMPLETE CBC W/AUTO DIFF WBC: CPT | Performed by: EMERGENCY MEDICINE

## 2022-10-30 PROCEDURE — 83690 ASSAY OF LIPASE: CPT

## 2022-10-30 PROCEDURE — 99284 EMERGENCY DEPT VISIT MOD MDM: CPT

## 2022-10-30 PROCEDURE — 84484 ASSAY OF TROPONIN QUANT: CPT

## 2022-10-30 RX ORDER — ASPIRIN 81 MG/1
324 TABLET, CHEWABLE ORAL ONCE
Status: DISCONTINUED | OUTPATIENT
Start: 2022-10-30 | End: 2022-10-31 | Stop reason: HOSPADM

## 2022-10-30 RX ORDER — POTASSIUM CHLORIDE 750 MG/1
10 TABLET, FILM COATED, EXTENDED RELEASE ORAL DAILY
Qty: 7 TABLET | Refills: 0 | Status: SHIPPED | OUTPATIENT
Start: 2022-10-30

## 2022-10-30 RX ORDER — SODIUM CHLORIDE 0.9 % (FLUSH) 0.9 %
10 SYRINGE (ML) INJECTION AS NEEDED
Status: DISCONTINUED | OUTPATIENT
Start: 2022-10-30 | End: 2022-10-31 | Stop reason: HOSPADM

## 2022-10-30 RX ORDER — OMEPRAZOLE 40 MG/1
40 CAPSULE, DELAYED RELEASE ORAL DAILY
COMMUNITY

## 2022-11-02 NOTE — PROGRESS NOTES
Occupational Therapy Daily Treatment Note      Patient: Julia Spain   : 1970  Referring practitioner: No ref. provider found  Date of Initial Visit: Type: THERAPY  Noted: 2021  Today's Date: 2021  Patient seen for 4 sessions    ICD-10-CM ICD-9-CM   1. Strain of right elbow, subsequent encounter  S46.911D V58.89     841.9   2. Right elbow pain  M25.521 719.42          Julia Spain reports when I went on vacation I wasn't able to sleep with the brace on, and it still hurts.     Objective   See Exercise, Manual, and Modality Logs for complete treatment.   Tried fitting elbow brace patient has for night use, pt reports it keeps sliding off.  The splint does not fit correctly.  Discussed some modifications in wearing to try to improve functionality.     0-125      Assessment/Plan  Continues to have difficulty sleeping without pain.Kinesiotape applied along radial nerve and over elbow pain for space correction      Cont per POC           Timed:  Manual Therapy:    10     mins  39403;  Therapeutic Exercise:    10     mins  17316;  Therapeutic Activity:    10     mins  90635;     Neuromuscular Pa:    0    mins  48570;    Ultrasound:     0     mins  68247;    Electrical Stimulation:    0     mins  42065;    Untimed:  Electrical Stimulation:    0     mins  30468 ( );  Fluidotherapy:        0    mins  93580  Paraffin:                          0    mins  60660    Timed Treatment:   30   mins   Total Treatment:     30   mins    OT SIGNATURE: BRIANDA Mancera, OTR/L, CHT     Electronically signed    [place] LICENSE: [id number]     
Family

## 2022-11-03 LAB
QT INTERVAL: 369 MS
QT INTERVAL: 427 MS

## 2023-01-25 ENCOUNTER — HOSPITAL ENCOUNTER (EMERGENCY)
Facility: HOSPITAL | Age: 53
Discharge: HOME | End: 2023-01-25
Attending: EMERGENCY MEDICINE | Admitting: EMERGENCY MEDICINE
Payer: COMMERCIAL

## 2023-01-25 ENCOUNTER — APPOINTMENT (OUTPATIENT)
Dept: GENERAL RADIOLOGY | Facility: HOSPITAL | Age: 53
End: 2023-01-25
Payer: COMMERCIAL

## 2023-01-25 VITALS
HEIGHT: 68 IN | OXYGEN SATURATION: 100 % | TEMPERATURE: 97.7 F | RESPIRATION RATE: 19 BRPM | WEIGHT: 273.59 LBS | DIASTOLIC BLOOD PRESSURE: 94 MMHG | HEART RATE: 49 BPM | BODY MASS INDEX: 41.46 KG/M2 | SYSTOLIC BLOOD PRESSURE: 131 MMHG

## 2023-01-25 DIAGNOSIS — S89.92XA LEFT KNEE INJURY, INITIAL ENCOUNTER: Primary | ICD-10-CM

## 2023-01-25 DIAGNOSIS — S80.12XA CONTUSION OF LEFT LOWER LEG, INITIAL ENCOUNTER: ICD-10-CM

## 2023-01-25 PROCEDURE — 73562 X-RAY EXAM OF KNEE 3: CPT

## 2023-01-25 PROCEDURE — 99283 EMERGENCY DEPT VISIT LOW MDM: CPT

## 2023-01-25 PROCEDURE — 73590 X-RAY EXAM OF LOWER LEG: CPT

## 2023-01-25 RX ORDER — HYDROCODONE BITARTRATE AND ACETAMINOPHEN 5; 325 MG/1; MG/1
1 TABLET ORAL EVERY 6 HOURS PRN
Qty: 12 TABLET | Refills: 0 | OUTPATIENT
Start: 2023-01-25 | End: 2023-02-07

## 2023-02-06 PROCEDURE — 99282 EMERGENCY DEPT VISIT SF MDM: CPT

## 2023-02-06 PROCEDURE — 96374 THER/PROPH/DIAG INJ IV PUSH: CPT

## 2023-02-07 ENCOUNTER — APPOINTMENT (OUTPATIENT)
Dept: CT IMAGING | Facility: HOSPITAL | Age: 53
End: 2023-02-07
Payer: COMMERCIAL

## 2023-02-07 ENCOUNTER — HOSPITAL ENCOUNTER (EMERGENCY)
Facility: HOSPITAL | Age: 53
Discharge: HOME | End: 2023-02-07
Attending: EMERGENCY MEDICINE | Admitting: EMERGENCY MEDICINE
Payer: COMMERCIAL

## 2023-02-07 VITALS
DIASTOLIC BLOOD PRESSURE: 96 MMHG | WEIGHT: 275.35 LBS | RESPIRATION RATE: 18 BRPM | OXYGEN SATURATION: 98 % | BODY MASS INDEX: 41.73 KG/M2 | HEIGHT: 68 IN | HEART RATE: 56 BPM | TEMPERATURE: 97.6 F | SYSTOLIC BLOOD PRESSURE: 167 MMHG

## 2023-02-07 DIAGNOSIS — S87.82XD: Primary | ICD-10-CM

## 2023-02-07 DIAGNOSIS — S80.12XA TRAUMATIC HEMATOMA OF LEFT LOWER LEG, INITIAL ENCOUNTER: ICD-10-CM

## 2023-02-07 PROCEDURE — 25010000002 KETOROLAC TROMETHAMINE PER 15 MG: Performed by: NURSE PRACTITIONER

## 2023-02-07 PROCEDURE — 96374 THER/PROPH/DIAG INJ IV PUSH: CPT

## 2023-02-07 PROCEDURE — 0 IOPAMIDOL PER 1 ML: Performed by: EMERGENCY MEDICINE

## 2023-02-07 PROCEDURE — 73701 CT LOWER EXTREMITY W/DYE: CPT

## 2023-02-07 RX ORDER — KETOROLAC TROMETHAMINE 30 MG/ML
30 INJECTION, SOLUTION INTRAMUSCULAR; INTRAVENOUS ONCE
Status: COMPLETED | OUTPATIENT
Start: 2023-02-07 | End: 2023-02-07

## 2023-02-07 RX ADMIN — KETOROLAC TROMETHAMINE 30 MG: 30 INJECTION, SOLUTION INTRAMUSCULAR; INTRAVENOUS at 03:32

## 2023-02-07 RX ADMIN — IOPAMIDOL 100 ML: 755 INJECTION, SOLUTION INTRAVENOUS at 04:09

## 2023-02-07 NOTE — DISCHARGE INSTRUCTIONS
CT scan shows no underlying bony abnormality but a large hematoma which is likely the source of your pain and swelling and firmness of the leg.    Rest.  Ice.  May Ace wrap for comfort.  Elevate above heart level as often as you can.    Continue to take your home pain medication as needed for severe pain.    With your PCP if no better
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
## 2023-02-08 ENCOUNTER — OFFICE VISIT (OUTPATIENT)
Dept: ORTHOPEDIC SURGERY | Facility: CLINIC | Age: 53
End: 2023-02-08
Payer: COMMERCIAL

## 2023-02-08 VITALS — BODY MASS INDEX: 41.68 KG/M2 | HEART RATE: 66 BPM | WEIGHT: 275 LBS | OXYGEN SATURATION: 98 % | HEIGHT: 68 IN

## 2023-02-08 DIAGNOSIS — S80.02XA HEMATOMA OF LEFT KNEE REGION: Primary | ICD-10-CM

## 2023-02-08 DIAGNOSIS — S89.92XA INJURY OF LEFT LOWER EXTREMITY, INITIAL ENCOUNTER: ICD-10-CM

## 2023-02-08 PROCEDURE — 99213 OFFICE O/P EST LOW 20 MIN: CPT | Performed by: ORTHOPAEDIC SURGERY
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
## 2023-02-09 ENCOUNTER — TRANSCRIBE ORDERS (OUTPATIENT)
Dept: ADMINISTRATIVE | Facility: HOSPITAL | Age: 53
End: 2023-02-09
Payer: COMMERCIAL

## 2023-02-09 DIAGNOSIS — Z12.31 SCREENING MAMMOGRAM FOR HIGH-RISK PATIENT: Primary | ICD-10-CM

## 2023-04-26 ENCOUNTER — HOSPITAL ENCOUNTER (OUTPATIENT)
Dept: MAMMOGRAPHY | Facility: HOSPITAL | Age: 53
Discharge: HOME | End: 2023-04-26
Admitting: NURSE PRACTITIONER
Payer: COMMERCIAL

## 2023-04-26 DIAGNOSIS — Z12.31 SCREENING MAMMOGRAM FOR HIGH-RISK PATIENT: ICD-10-CM

## 2023-04-26 PROCEDURE — 77063 BREAST TOMOSYNTHESIS BI: CPT

## 2023-04-26 PROCEDURE — 77067 SCR MAMMO BI INCL CAD: CPT

## 2023-10-11 ENCOUNTER — APPOINTMENT (OUTPATIENT)
Dept: CT IMAGING | Facility: HOSPITAL | Age: 53
End: 2023-10-11
Payer: COMMERCIAL

## 2023-10-11 ENCOUNTER — HOSPITAL ENCOUNTER (EMERGENCY)
Facility: HOSPITAL | Age: 53
Discharge: HOME | End: 2023-10-12
Attending: EMERGENCY MEDICINE
Payer: COMMERCIAL

## 2023-10-11 DIAGNOSIS — M54.9 BACK PAIN, UNSPECIFIED BACK LOCATION, UNSPECIFIED BACK PAIN LATERALITY, UNSPECIFIED CHRONICITY: Primary | ICD-10-CM

## 2023-10-11 LAB
ALBUMIN SERPL-MCNC: 4.1 G/DL (ref 3.5–5.2)
ALBUMIN/GLOB SERPL: 1 G/DL
ALP SERPL-CCNC: 104 U/L (ref 39–117)
ALT SERPL W P-5'-P-CCNC: 9 U/L (ref 1–33)
ANION GAP SERPL CALCULATED.3IONS-SCNC: 8.6 MMOL/L (ref 5–15)
AST SERPL-CCNC: 12 U/L (ref 1–32)
BACTERIA UR QL AUTO: ABNORMAL /HPF
BASOPHILS # BLD AUTO: 0.04 10*3/MM3 (ref 0–0.2)
BASOPHILS NFR BLD AUTO: 0.6 % (ref 0–1.5)
BILIRUB SERPL-MCNC: 0.6 MG/DL (ref 0–1.2)
BILIRUB UR QL STRIP: NEGATIVE
BUN SERPL-MCNC: 11 MG/DL (ref 6–20)
BUN/CREAT SERPL: 14.7 (ref 7–25)
CALCIUM SPEC-SCNC: 9.5 MG/DL (ref 8.6–10.5)
CHLORIDE SERPL-SCNC: 107 MMOL/L (ref 98–107)
CLARITY UR: CLEAR
CO2 SERPL-SCNC: 22.4 MMOL/L (ref 22–29)
COLOR UR: ABNORMAL
CREAT SERPL-MCNC: 0.75 MG/DL (ref 0.57–1)
D-LACTATE SERPL-SCNC: 1.9 MMOL/L (ref 0.5–2)
DEPRECATED RDW RBC AUTO: 50.9 FL (ref 37–54)
EGFRCR SERPLBLD CKD-EPI 2021: 94.7 ML/MIN/1.73
EOSINOPHIL # BLD AUTO: 0.05 10*3/MM3 (ref 0–0.4)
EOSINOPHIL NFR BLD AUTO: 0.7 % (ref 0.3–6.2)
ERYTHROCYTE [DISTWIDTH] IN BLOOD BY AUTOMATED COUNT: 17.3 % (ref 12.3–15.4)
GLOBULIN UR ELPH-MCNC: 4.1 GM/DL
GLUCOSE SERPL-MCNC: 136 MG/DL (ref 65–99)
GLUCOSE UR STRIP-MCNC: NEGATIVE MG/DL
HCG INTACT+B SERPL-ACNC: <0.5 MIU/ML
HCT VFR BLD AUTO: 37.5 % (ref 34–46.6)
HGB BLD-MCNC: 11.6 G/DL (ref 12–15.9)
HGB UR QL STRIP.AUTO: ABNORMAL
HOLD SPECIMEN: NORMAL
HOLD SPECIMEN: NORMAL
HYALINE CASTS UR QL AUTO: ABNORMAL /LPF
IMM GRANULOCYTES # BLD AUTO: 0.01 10*3/MM3 (ref 0–0.05)
IMM GRANULOCYTES NFR BLD AUTO: 0.1 % (ref 0–0.5)
KETONES UR QL STRIP: NEGATIVE
LEUKOCYTE ESTERASE UR QL STRIP.AUTO: NEGATIVE
LIPASE SERPL-CCNC: 15 U/L (ref 13–60)
LYMPHOCYTES # BLD AUTO: 2.12 10*3/MM3 (ref 0.7–3.1)
LYMPHOCYTES NFR BLD AUTO: 29.6 % (ref 19.6–45.3)
MCH RBC QN AUTO: 25.3 PG (ref 26.6–33)
MCHC RBC AUTO-ENTMCNC: 30.9 G/DL (ref 31.5–35.7)
MCV RBC AUTO: 81.7 FL (ref 79–97)
MONOCYTES # BLD AUTO: 0.49 10*3/MM3 (ref 0.1–0.9)
MONOCYTES NFR BLD AUTO: 6.8 % (ref 5–12)
NEUTROPHILS NFR BLD AUTO: 4.46 10*3/MM3 (ref 1.7–7)
NEUTROPHILS NFR BLD AUTO: 62.2 % (ref 42.7–76)
NITRITE UR QL STRIP: NEGATIVE
NRBC BLD AUTO-RTO: 0 /100 WBC (ref 0–0.2)
PH UR STRIP.AUTO: <=5 [PH] (ref 5–8)
PLATELET # BLD AUTO: 359 10*3/MM3 (ref 140–450)
PMV BLD AUTO: 9.3 FL (ref 6–12)
POTASSIUM SERPL-SCNC: 3.6 MMOL/L (ref 3.5–5.2)
PROT SERPL-MCNC: 8.2 G/DL (ref 6–8.5)
PROT UR QL STRIP: NEGATIVE
RBC # BLD AUTO: 4.59 10*6/MM3 (ref 3.77–5.28)
RBC # UR STRIP: ABNORMAL /HPF
REF LAB TEST METHOD: ABNORMAL
SODIUM SERPL-SCNC: 138 MMOL/L (ref 136–145)
SP GR UR STRIP: 1.03 (ref 1–1.03)
SQUAMOUS #/AREA URNS HPF: ABNORMAL /HPF
UROBILINOGEN UR QL STRIP: ABNORMAL
WBC # UR STRIP: ABNORMAL /HPF
WBC NRBC COR # BLD: 7.17 10*3/MM3 (ref 3.4–10.8)
WHOLE BLOOD HOLD COAG: NORMAL
WHOLE BLOOD HOLD SPECIMEN: NORMAL

## 2023-10-11 PROCEDURE — 36415 COLL VENOUS BLD VENIPUNCTURE: CPT

## 2023-10-11 PROCEDURE — 25510000001 IOPAMIDOL PER 1 ML

## 2023-10-11 PROCEDURE — 74177 CT ABD & PELVIS W/CONTRAST: CPT

## 2023-10-11 PROCEDURE — 96372 THER/PROPH/DIAG INJ SC/IM: CPT

## 2023-10-11 PROCEDURE — 99285 EMERGENCY DEPT VISIT HI MDM: CPT

## 2023-10-11 PROCEDURE — 83690 ASSAY OF LIPASE: CPT

## 2023-10-11 PROCEDURE — 83605 ASSAY OF LACTIC ACID: CPT

## 2023-10-11 PROCEDURE — 84702 CHORIONIC GONADOTROPIN TEST: CPT

## 2023-10-11 PROCEDURE — 80053 COMPREHEN METABOLIC PANEL: CPT

## 2023-10-11 PROCEDURE — 25010000002 KETOROLAC TROMETHAMINE PER 15 MG

## 2023-10-11 PROCEDURE — 81001 URINALYSIS AUTO W/SCOPE: CPT

## 2023-10-11 PROCEDURE — 85025 COMPLETE CBC W/AUTO DIFF WBC: CPT

## 2023-10-11 RX ORDER — SODIUM CHLORIDE 0.9 % (FLUSH) 0.9 %
10 SYRINGE (ML) INJECTION AS NEEDED
Status: DISCONTINUED | OUTPATIENT
Start: 2023-10-11 | End: 2023-10-12 | Stop reason: HOSPADM

## 2023-10-11 RX ORDER — KETOROLAC TROMETHAMINE 30 MG/ML
60 INJECTION, SOLUTION INTRAMUSCULAR; INTRAVENOUS ONCE
Status: COMPLETED | OUTPATIENT
Start: 2023-10-11 | End: 2023-10-11

## 2023-10-11 RX ADMIN — IOPAMIDOL 93 ML: 755 INJECTION, SOLUTION INTRAVENOUS at 23:40

## 2023-10-11 RX ADMIN — KETOROLAC TROMETHAMINE 60 MG: 60 INJECTION, SOLUTION INTRAMUSCULAR at 23:56

## 2023-10-12 VITALS
OXYGEN SATURATION: 99 % | DIASTOLIC BLOOD PRESSURE: 65 MMHG | BODY MASS INDEX: 37.15 KG/M2 | SYSTOLIC BLOOD PRESSURE: 128 MMHG | WEIGHT: 245.15 LBS | HEART RATE: 62 BPM | TEMPERATURE: 98 F | RESPIRATION RATE: 18 BRPM | HEIGHT: 68 IN

## 2023-10-12 RX ORDER — KETOROLAC TROMETHAMINE 10 MG/1
10 TABLET, FILM COATED ORAL EVERY 6 HOURS PRN
Qty: 15 TABLET | Refills: 0 | Status: SHIPPED | OUTPATIENT
Start: 2023-10-12

## 2023-10-12 RX ORDER — CYCLOBENZAPRINE HCL 10 MG
10 TABLET ORAL 3 TIMES DAILY
Qty: 20 TABLET | Refills: 0 | Status: SHIPPED | OUTPATIENT
Start: 2023-10-12

## 2023-10-12 NOTE — ED PROVIDER NOTES
Time: 11:15 PM EDT  Date of encounter:  10/11/2023  Independent Historian/Clinical History and Information was obtained by:   Patient    History is limited by: N/A    Chief Complaint   Patient presents with    Flank Pain     Right sided         History of Present Illness:  Patient is a 52 y.o. year old female who presents to the emergency department for evaluation of right lower back pain and flank pain that started at 10:00 this am. Pt reports that she has been unable to find a position of. She has had 1 episode of vomiting. No diarrhea, no fever, or chills. Pt feels as if her urinary frequency has decreased. Pain is a 7 at rest but increases with movement and palpation. She reports that the time she hurt like this she had a sever UTI - same side, about 20 yrs ago. She does not have an appendix gallbladder (ELVIA Estevez)      Patient Care Team  Primary Care Provider: Christina Brady APRN    Past Medical History:     Allergies   Allergen Reactions    Dilaudid [Hydromorphone] Itching     Past Medical History:   Diagnosis Date    Anemia     Anxiety     Depression     Headache     Hemorrhoids     Hiatal hernia     History of COVID-19 04/2022    Hypertension     Multiple joint pain      Past Surgical History:   Procedure Laterality Date    APPENDECTOMY  1993    CHOLECYSTECTOMY  2016    ENDOSCOPY N/A 6/14/2022    Procedure: ESOPHAGOGASTRODUODENOSCOPY WITH BIOPSY;  Surgeon: Royce Solorio Jr., MD;  Location: [facility] ENDOSCOPY;  Service: General;  Laterality: N/A;  PRE- DYSPEPSIA, H/O SLEEVE  POST- GASTRITIS    GASTRIC SLEEVE LAPAROSCOPIC  06/25/2013    dr. valentin langford    KNEE SURGERY Right 2000     Family History   Problem Relation Age of Onset    Hyperlipidemia Mother     Hyperlipidemia Father     Hyperlipidemia Sister     Diabetes Sister     Hyperlipidemia Paternal Grandmother     Diabetes Paternal Grandmother     Malig Hyperthermia Neg Hx        Home Medications:  Prior to Admission medications     Medication Sig Start Date End Date Taking? Authorizing Provider   ALPRAZolam (XANAX) 0.25 MG tablet Take 0.25 mg by mouth 2 (Two) Times a Day As Needed for Anxiety.    Isabell Omalley MD   cetirizine (zyrTEC) 10 MG tablet Take 10 mg by mouth Daily.    Isabell Omalley MD   Cholecalciferol 125 MCG (5000 UT) tablet Vitamin D3 125 mcg (5,000 unit) oral tablet take 1 tablet by oral route daily   Active    Isabell Omalley MD   Diclofenac Sodium (VOLTAREN) 1 % gel gel As Needed.    Isabell Omalley MD   fluticasone (FLONASE) 50 MCG/ACT nasal spray Instrill 1 spray into the affected nostril(s) as directed by provider Daily. 21      hydrOXYzine (ATARAX) 25 MG tablet Take 1 tablet by mouth every night at bedtime as needed. 21      lisinopril-hydrochlorothiazide (PRINZIDE,ZESTORETIC) 10-12.5 MG per tablet Take 1 tablet by mouth once daily 21      metoprolol succinate XL (TOPROL-XL) 25 MG 24 hr tablet Take 1 tablet by mouth Daily.  Patient taking differently: Take 25 mg by mouth Every Evening. 21      montelukast (SINGULAIR) 10 MG tablet Take 1 tablet by mouth every night at bedtime. 21      omeprazole (priLOSEC) 40 MG capsule Take 1 capsule by mouth Daily.    ProviderIsabell MD   potassium chloride 10 MEQ CR tablet Take 1 tablet by mouth Daily. 10/30/22   Rhys Vincent,         Social History:   Social History     Tobacco Use    Smoking status: Former     Types: Cigarettes     Quit date:      Years since quittin.7    Smokeless tobacco: Never   Vaping Use    Vaping Use: Never used   Substance Use Topics    Alcohol use: Yes     Comment: MONTHLY    Drug use: Never         Review of Systems:  Review of Systems   Constitutional:  Negative for chills and fever.   HENT:  Negative for congestion, rhinorrhea and sore throat.    Eyes:  Negative for pain and visual disturbance.   Respiratory:  Negative for apnea, cough, chest tightness and shortness of breath.   "  Cardiovascular:  Negative for chest pain and palpitations.   Gastrointestinal:  Negative for abdominal pain, diarrhea, nausea and vomiting.   Genitourinary:  Negative for difficulty urinating and dysuria.   Musculoskeletal:  Negative for joint swelling and myalgias.   Skin:  Negative for color change.   Neurological:  Negative for seizures and headaches.   Psychiatric/Behavioral: Negative.     All other systems reviewed and are negative.       Physical Exam:  /87 (BP Location: Right arm, Patient Position: Sitting)   Pulse 53   Temp 98 øF (36.7 øC) (Oral)   Resp 18   Ht 172.7 cm (68\")   Wt 111 kg (245 lb 2.4 oz)   LMP 10/11/2023   SpO2 99%   BMI 37.28 kg/mý         Physical Exam  Vitals and nursing note reviewed.   Constitutional:       General: She is not in acute distress.     Appearance: Normal appearance. She is not toxic-appearing.   HENT:      Head: Normocephalic and atraumatic.      Jaw: There is normal jaw occlusion.   Eyes:      General: Lids are normal.      Extraocular Movements: Extraocular movements intact.      Conjunctiva/sclera: Conjunctivae normal.      Pupils: Pupils are equal, round, and reactive to light.   Cardiovascular:      Rate and Rhythm: Normal rate and regular rhythm.      Pulses: Normal pulses.      Heart sounds: Normal heart sounds.   Pulmonary:      Effort: Pulmonary effort is normal. No respiratory distress.      Breath sounds: Normal breath sounds. No wheezing or rhonchi.   Abdominal:      General: Abdomen is flat.      Palpations: Abdomen is soft.      Tenderness: There is no abdominal tenderness. There is right CVA tenderness. There is no guarding or rebound.   Musculoskeletal:         General: Normal range of motion.      Cervical back: Normal range of motion and neck supple.      Right lower leg: No edema.      Left lower leg: No edema.   Skin:     General: Skin is warm and dry.   Neurological:      Mental Status: She is alert and oriented to person, place, and " time. Mental status is at baseline.   Psychiatric:         Mood and Affect: Mood normal.                    Procedures:  Procedures      Medical Decision Making:      Comorbidities that affect care:    Hypertension    External Notes reviewed:    Previous Clinic Note: Patient was seen in clinic for left knee pain.      The following orders were placed and all results were independently analyzed by me:  Orders Placed This Encounter   Procedures    CT Abdomen Pelvis With Contrast    [place] Draw    Comprehensive Metabolic Panel    Lipase    Lactic Acid, Plasma    Urinalysis With Microscopic If Indicated (No Culture) - Urine, Clean Catch    hCG, Quantitative, Pregnancy    CBC Auto Differential    Urinalysis, Microscopic Only - Urine, Clean Catch    NPO Diet NPO Type: Strict NPO    Undress & Gown    Insert Peripheral IV    CBC & Differential    Green Top (Gel)    Lavender Top    Gold Top - SST    Light Blue Top       Medications Given in the Emergency Department:  Medications   sodium chloride 0.9 % flush 10 mL (has no administration in time range)   ketorolac (TORADOL) injection 60 mg (60 mg Intramuscular Given 10/11/23 8216)   iopamidol (ISOVUE-370) 76 % injection 100 mL (93 mL Intravenous Given 10/11/23 2340)        ED Course:    The patient was initially evaluated in the triage area where orders were placed. The patient was later dispositioned by Lisa Barlow MD.      The patient was advised to stay for completion of workup which includes but is not limited to communication of labs and radiological results, reassessment and plan. The patient was advised that leaving prior to disposition by a provider could result in critical findings that are not communicated to the patient.     ED Course as of 10/12/23 0132   Wed Oct 11, 2023   4917   --- PROVIDER IN TRIAGE NOTE ---    Patient was seen and evaluated in triage by ELVIA Mills.  Orders were written and the patient is currently awaiting disposition.    [MS]   2319 Blood, UA(!): Large (3+)  Pt is on her menstrual cycle.  [MS]      ED Course User Index  [MS] Mary Patterson, ELVIA       Labs:    Lab Results (last 24 hours)       Procedure Component Value Units Date/Time    CBC & Differential [[id number]]  (Abnormal) Collected: 10/11/23 2052    Specimen: Blood from Arm, Right Updated: 10/11/23 2059    Narrative:      The following orders were created for panel order CBC & Differential.  Procedure                               Abnormality         Status                     ---------                               -----------         ------                     CBC Auto Differential[[id number]]        Abnormal            Final result                 Please view results for these tests on the individual orders.    Comprehensive Metabolic Panel [[id number]]  (Abnormal) Collected: 10/11/23 2052    Specimen: Blood from Arm, Right Updated: 10/11/23 2122     Glucose 136 mg/dL      BUN 11 mg/dL      Creatinine 0.75 mg/dL      Sodium 138 mmol/L      Potassium 3.6 mmol/L      Chloride 107 mmol/L      CO2 22.4 mmol/L      Calcium 9.5 mg/dL      Total Protein 8.2 g/dL      Albumin 4.1 g/dL      ALT (SGPT) 9 U/L      AST (SGOT) 12 U/L      Alkaline Phosphatase 104 U/L      Total Bilirubin 0.6 mg/dL      Globulin 4.1 gm/dL      A/G Ratio 1.0 g/dL      BUN/Creatinine Ratio 14.7     Anion Gap 8.6 mmol/L      eGFR 94.7 mL/min/1.73     Narrative:      GFR Normal >60  Chronic Kidney Disease <60  Kidney Failure <15      Lipase [[id number]]  (Normal) Collected: 10/11/23 2052    Specimen: Blood from Arm, Right Updated: 10/11/23 2122     Lipase 15 U/L     Lactic Acid, Plasma [[id number]]  (Normal) Collected: 10/11/23 2052    Specimen: Blood from Arm, Right Updated: 10/11/23 2118     Lactate 1.9 mmol/L     Urinalysis With Microscopic If Indicated (No Culture) - Urine, Clean Catch [[id number]]  (Abnormal) Collected: 10/11/23 2052    Specimen: Urine, Clean Catch Updated: 10/11/23 2118     Color,  UA Dark Yellow     Appearance, UA Clear     pH, UA <=5.0     Specific Gravity, UA 1.027     Glucose, UA Negative     Ketones, UA Negative     Bilirubin, UA Negative     Blood, UA Large (3+)     Protein, UA Negative     Leuk Esterase, UA Negative     Nitrite, UA Negative     Urobilinogen, UA 1.0 E.U./dL    hCG, Quantitative, Pregnancy [[id number]] Collected: 10/11/23 2052    Specimen: Blood from Arm, Right Updated: 10/11/23 2121     HCG Quantitative <0.50 mIU/mL     Narrative:      HCG Ranges by Gestational Age    Females - non-pregnant premenopausal   </= 1mIU/mL HCG  Females - postmenopausal               </= 7mIU/mL HCG    3 Weeks       5.4   -      72 mIU/mL  4 Weeks      10.2   -     708 mIU/mL  5 Weeks       217   -   8,245 mIU/mL  6 Weeks       152   -  32,177 mIU/mL  7 Weeks     4,059   - 153,767 mIU/mL  8 Weeks    31,366   - 149,094 mIU/mL  9 Weeks    59,109   - 135,901 mIU/mL  10 Weeks   44,186   - 170,409 mIU/mL  12 Weeks   27,107   - 201,615 mIU/mL  14 Weeks   24,302   -  93,646 mIU/mL  15 Weeks   12,540   -  69,747 mIU/mL  16 Weeks    8,904   -  55,332 mIU/mL  17 Weeks    8,240   -  51,793 mIU/mL  18 Weeks    9,649   -  55,271 mIU/mL      CBC Auto Differential [[id number]]  (Abnormal) Collected: 10/11/23 2052    Specimen: Blood from Arm, Right Updated: 10/11/23 2059     WBC 7.17 10*3/mm3      RBC 4.59 10*6/mm3      Hemoglobin 11.6 g/dL      Hematocrit 37.5 %      MCV 81.7 fL      MCH 25.3 pg      MCHC 30.9 g/dL      RDW 17.3 %      RDW-SD 50.9 fl      MPV 9.3 fL      Platelets 359 10*3/mm3      Neutrophil % 62.2 %      Lymphocyte % 29.6 %      Monocyte % 6.8 %      Eosinophil % 0.7 %      Basophil % 0.6 %      Immature Grans % 0.1 %      Neutrophils, Absolute 4.46 10*3/mm3      Lymphocytes, Absolute 2.12 10*3/mm3      Monocytes, Absolute 0.49 10*3/mm3      Eosinophils, Absolute 0.05 10*3/mm3      Basophils, Absolute 0.04 10*3/mm3      Immature Grans, Absolute 0.01 10*3/mm3      nRBC 0.0 /100 WBC      Urinalysis, Microscopic Only - Urine, Clean Catch [[id number]]  (Abnormal) Collected: 10/11/23 2052    Specimen: Urine, Clean Catch Updated: 10/11/23 2131     RBC, UA 6-12 /HPF      WBC, UA 3-5 /HPF      Bacteria, UA Trace /HPF      Squamous Epithelial Cells, UA 0-2 /HPF      Hyaline Casts, UA None Seen /LPF      Methodology Manual Light Microscopy             Imaging:    CT Abdomen Pelvis With Contrast    Result Date: 10/11/2023  PROCEDURE: CT ABDOMEN PELVIS W CONTRAST  COMPARISON: [facility], CT, ABDOMEN/PELVIS WITH CONTRAST, 3/26/2021, 19:15.  INDICATIONS: right flank pain  TECHNIQUE: After obtaining the patient's consent, CT images were created with non-ionic intravenous contrast material.   PROTOCOL:   Standard imaging protocol performed    RADIATION:   DLP: 1292.3 mGy*cm   Automated exposure control was utilized to minimize radiation dose. CONTRAST: 100 cc Isovue 370 I.V.  FINDINGS:  Included lung bases are clear.  Gallbladder is not visualized.  Liver, pancreas, spleen, adrenal glands, kidneys appear unremarkable.  There are postoperative changes along the greater curvature of the stomach.  No abnormal small bowel distention is seen.  Appendix is not visualized, presumably surgically absent.  There is stool throughout the colon.  No urinary bladder, uterine, or adnexal abnormality is seen on CT.  There is no significant free fluid or adenopathy.  No acute osseous abnormality is identified.  There is degenerative change at L5-S1.        No acute abdominal or pelvic abnormality is identified.     HAKEEM URRUTIA MD       Electronically Signed and Approved By: HAKEEM URRUTIA MD on 10/11/2023 at 23:56                Differential Diagnosis and Discussion:      Flank Pain: Differential diagnosis includes but is not limited to kidney stones, pyelonephritis, musculoskeletal disorders, renal infarction, urinary tract infection, hydronephrosis, radiculopathy, aortic aneurysm, renal cell  carcinoma.    All labs were reviewed and interpreted by me.  CT scan radiology impression was interpreted by me.    MDM     Amount and/or Complexity of Data Reviewed  Clinical lab tests: reviewed  Tests in the radiology section of CPTr: reviewed       The patient's symptoms are consistent with musculoskeletal back pain.  The patient's CBC that was reviewed and interpreted by me shows no abnormalities of critical concern. Of note, there is no anemia requiring a blood transfusion and the platelet count is acceptable.  The patient's CMP that was reviewed and interpretted by me shows no abnormalities of critical concern. Of note, the patient's sodium and potassium are acceptable. The patient's liver enzymes are unremarkable. The patient's renal function (creatinine) is preserved. The patient has a normal anion gap.  Urinalysis is negative for bacteriuria.  CT scan of the abdomen pelvis is negative for acute intra-abdominal pathology.  The patient is now resting comfortably, feels better, is alert, talkative, interactive and in no distress. The repeat examination is unremarkable and benign. The patient is neurologically intact and is ambulatory in the ED. The patient has no fever, no bowel or bladder incontinence, no saddle anesthesia, and is otherwise alert and well appearing. The history, physical exam, and diagnostics (if any) do not suggest the presence of acute spinal epidural abscess, acute spinal epidural bleed, cauda equina syndrome, abdominal aortic aneurysm, aortic dissection or other process requiring further testing, treatment or consultation in the emergency department. The vital signs have been stable. The patient's condition is stable and appropriate for discharge. The patient will pursue further outpatient evaluation with the primary care physician or other designated for consulting position as indicated in the discharge instructions.        Patient Care Considerations:    ANTIBIOTICS: I considered  prescribing antibiotics as an outpatient however no bacterial focus of infection was found.      Consultants/Shared Management Plan:    None    Social Determinants of Health:    Patient is independent, reliable, and has access to care.       Disposition and Care Coordination:    Discharged: I considered escalation of care by admitting this patient for observation, however the patient has improved and is suitable and  stable for discharge.    I have explained the patient's condition, diagnoses and treatment plan based on the information available to me at this time. I have answered questions and addressed any concerns. The patient has a good  understanding of the patient's diagnosis, condition, and treatment plan as can be expected at this point. The vital signs have been stable. The patient's condition is stable and appropriate for discharge from the emergency department.      The patient will pursue further outpatient evaluation with the primary care physician or other designated or consulting physician as outlined in the discharge instructions. They are agreeable to this plan of care and follow-up instructions have been explained in detail. The patient has received these instructions in written format and have expressed an understanding of the discharge instructions. The patient is aware that any significant change in condition or worsening of symptoms should prompt an immediate return to this or the closest emergency department or call to 911.  I have explained discharge medications and the need for follow up with the patient/caretakers. This was also printed in the discharge instructions. Patient was discharged with the following medications and follow up:      Medication List        New Prescriptions      cyclobenzaprine 10 MG tablet  Commonly known as: FLEXERIL  Take 1 tablet by mouth 3 (Three) Times a Day.     ketorolac 10 MG tablet  Commonly known as: TORADOL  Take 1 tablet by mouth Every 6 (Six) Hours As  Needed for Moderate Pain.            Changed      metoprolol succinate XL 25 MG 24 hr tablet  Commonly known as: TOPROL-XL  Take 1 tablet by mouth Daily.  What changed: when to take this               Where to Get Your Medications        These medications were sent to [facility]/pharmacy #[id number] - [place], [place] - [address] - [phone]  - [phone] FX  [address] [place] [address]      Hours: 24-hours Phone: [phone]   cyclobenzaprine 10 MG tablet  ketorolac 10 MG tablet      Christina Brady, APRN  [address] [facility] [id number] [phone]    In 2 days         Final diagnoses:   Back pain, unspecified back location, unspecified back pain laterality, unspecified chronicity        ED Disposition       ED Disposition   Discharge    Condition   Stable    Comment   --               This medical record created using voice recognition software.             Lisa Barlow MD  10/12/23 0134

## 2024-02-26 ENCOUNTER — APPOINTMENT (OUTPATIENT)
Dept: CT IMAGING | Facility: HOSPITAL | Age: 54
End: 2024-02-26
Payer: COMMERCIAL

## 2024-02-26 ENCOUNTER — HOSPITAL ENCOUNTER (EMERGENCY)
Facility: HOSPITAL | Age: 54
Discharge: HOME | End: 2024-02-26
Attending: EMERGENCY MEDICINE | Admitting: EMERGENCY MEDICINE
Payer: COMMERCIAL

## 2024-02-26 VITALS
SYSTOLIC BLOOD PRESSURE: 112 MMHG | HEIGHT: 68 IN | RESPIRATION RATE: 16 BRPM | DIASTOLIC BLOOD PRESSURE: 80 MMHG | OXYGEN SATURATION: 100 % | TEMPERATURE: 98 F | BODY MASS INDEX: 36.52 KG/M2 | HEART RATE: 54 BPM | WEIGHT: 240.96 LBS

## 2024-02-26 DIAGNOSIS — S30.1XXA ABDOMINAL HEMATOMA: ICD-10-CM

## 2024-02-26 DIAGNOSIS — R10.9 ABDOMINAL PAIN, UNSPECIFIED ABDOMINAL LOCATION: Primary | ICD-10-CM

## 2024-02-26 LAB
ALBUMIN SERPL-MCNC: 3.9 G/DL (ref 3.5–5.2)
ALBUMIN/GLOB SERPL: 0.9 G/DL
ALP SERPL-CCNC: 91 U/L (ref 39–117)
ALT SERPL W P-5'-P-CCNC: 9 U/L (ref 1–33)
ANION GAP SERPL CALCULATED.3IONS-SCNC: 9.8 MMOL/L (ref 5–15)
AST SERPL-CCNC: 16 U/L (ref 1–32)
BASOPHILS # BLD AUTO: 0.03 10*3/MM3 (ref 0–0.2)
BASOPHILS NFR BLD AUTO: 0.3 % (ref 0–1.5)
BILIRUB SERPL-MCNC: 1.1 MG/DL (ref 0–1.2)
BILIRUB UR QL STRIP: NEGATIVE
BUN SERPL-MCNC: 16 MG/DL (ref 6–20)
BUN/CREAT SERPL: 21.6 (ref 7–25)
CALCIUM SPEC-SCNC: 10.1 MG/DL (ref 8.6–10.5)
CHLORIDE SERPL-SCNC: 105 MMOL/L (ref 98–107)
CLARITY UR: CLEAR
CO2 SERPL-SCNC: 22.2 MMOL/L (ref 22–29)
COLOR UR: YELLOW
CREAT SERPL-MCNC: 0.74 MG/DL (ref 0.57–1)
D-LACTATE SERPL-SCNC: 0.9 MMOL/L (ref 0.5–2)
DEPRECATED RDW RBC AUTO: 50.7 FL (ref 37–54)
EGFRCR SERPLBLD CKD-EPI 2021: 95.7 ML/MIN/1.73
EOSINOPHIL # BLD AUTO: 0.06 10*3/MM3 (ref 0–0.4)
EOSINOPHIL NFR BLD AUTO: 0.7 % (ref 0.3–6.2)
ERYTHROCYTE [DISTWIDTH] IN BLOOD BY AUTOMATED COUNT: 17.1 % (ref 12.3–15.4)
GLOBULIN UR ELPH-MCNC: 4.3 GM/DL
GLUCOSE SERPL-MCNC: 90 MG/DL (ref 65–99)
GLUCOSE UR STRIP-MCNC: NEGATIVE MG/DL
HCT VFR BLD AUTO: 38.5 % (ref 34–46.6)
HGB BLD-MCNC: 12.1 G/DL (ref 12–15.9)
HGB UR QL STRIP.AUTO: NEGATIVE
HOLD SPECIMEN: NORMAL
HOLD SPECIMEN: NORMAL
IMM GRANULOCYTES # BLD AUTO: 0.02 10*3/MM3 (ref 0–0.05)
IMM GRANULOCYTES NFR BLD AUTO: 0.2 % (ref 0–0.5)
KETONES UR QL STRIP: NEGATIVE
LEUKOCYTE ESTERASE UR QL STRIP.AUTO: NEGATIVE
LIPASE SERPL-CCNC: 17 U/L (ref 13–60)
LYMPHOCYTES # BLD AUTO: 1.76 10*3/MM3 (ref 0.7–3.1)
LYMPHOCYTES NFR BLD AUTO: 20.4 % (ref 19.6–45.3)
MCH RBC QN AUTO: 25.7 PG (ref 26.6–33)
MCHC RBC AUTO-ENTMCNC: 31.4 G/DL (ref 31.5–35.7)
MCV RBC AUTO: 81.9 FL (ref 79–97)
MONOCYTES # BLD AUTO: 0.5 10*3/MM3 (ref 0.1–0.9)
MONOCYTES NFR BLD AUTO: 5.8 % (ref 5–12)
NEUTROPHILS NFR BLD AUTO: 6.24 10*3/MM3 (ref 1.7–7)
NEUTROPHILS NFR BLD AUTO: 72.6 % (ref 42.7–76)
NITRITE UR QL STRIP: NEGATIVE
NRBC BLD AUTO-RTO: 0 /100 WBC (ref 0–0.2)
PH UR STRIP.AUTO: <=5 [PH] (ref 5–8)
PLATELET # BLD AUTO: 279 10*3/MM3 (ref 140–450)
PMV BLD AUTO: 10 FL (ref 6–12)
POTASSIUM SERPL-SCNC: 4 MMOL/L (ref 3.5–5.2)
PROT SERPL-MCNC: 8.2 G/DL (ref 6–8.5)
PROT UR QL STRIP: NEGATIVE
RBC # BLD AUTO: 4.7 10*6/MM3 (ref 3.77–5.28)
SODIUM SERPL-SCNC: 137 MMOL/L (ref 136–145)
SP GR UR STRIP: >1.03 (ref 1–1.03)
UROBILINOGEN UR QL STRIP: ABNORMAL
WBC NRBC COR # BLD AUTO: 8.61 10*3/MM3 (ref 3.4–10.8)
WHOLE BLOOD HOLD COAG: NORMAL
WHOLE BLOOD HOLD SPECIMEN: NORMAL

## 2024-02-26 PROCEDURE — 74176 CT ABD & PELVIS W/O CONTRAST: CPT

## 2024-02-26 PROCEDURE — 83605 ASSAY OF LACTIC ACID: CPT | Performed by: EMERGENCY MEDICINE

## 2024-02-26 PROCEDURE — 99285 EMERGENCY DEPT VISIT HI MDM: CPT

## 2024-02-26 PROCEDURE — 80053 COMPREHEN METABOLIC PANEL: CPT | Performed by: EMERGENCY MEDICINE

## 2024-02-26 PROCEDURE — 25010000002 KETOROLAC TROMETHAMINE PER 15 MG: Performed by: EMERGENCY MEDICINE

## 2024-02-26 PROCEDURE — 25510000001 IOPAMIDOL PER 1 ML: Performed by: EMERGENCY MEDICINE

## 2024-02-26 PROCEDURE — 83690 ASSAY OF LIPASE: CPT | Performed by: EMERGENCY MEDICINE

## 2024-02-26 PROCEDURE — 81003 URINALYSIS AUTO W/O SCOPE: CPT | Performed by: EMERGENCY MEDICINE

## 2024-02-26 PROCEDURE — 96375 TX/PRO/DX INJ NEW DRUG ADDON: CPT

## 2024-02-26 PROCEDURE — 96374 THER/PROPH/DIAG INJ IV PUSH: CPT

## 2024-02-26 PROCEDURE — 85025 COMPLETE CBC W/AUTO DIFF WBC: CPT | Performed by: EMERGENCY MEDICINE

## 2024-02-26 PROCEDURE — 74177 CT ABD & PELVIS W/CONTRAST: CPT

## 2024-02-26 PROCEDURE — 25010000002 MORPHINE PER 10 MG: Performed by: EMERGENCY MEDICINE

## 2024-02-26 RX ORDER — SODIUM CHLORIDE 0.9 % (FLUSH) 0.9 %
10 SYRINGE (ML) INJECTION AS NEEDED
Status: DISCONTINUED | OUTPATIENT
Start: 2024-02-26 | End: 2024-02-26 | Stop reason: HOSPADM

## 2024-02-26 RX ORDER — KETOROLAC TROMETHAMINE 30 MG/ML
30 INJECTION, SOLUTION INTRAMUSCULAR; INTRAVENOUS ONCE
Status: COMPLETED | OUTPATIENT
Start: 2024-02-26 | End: 2024-02-26

## 2024-02-26 RX ADMIN — KETOROLAC TROMETHAMINE 30 MG: 30 INJECTION, SOLUTION INTRAMUSCULAR; INTRAVENOUS at 12:17

## 2024-02-26 RX ADMIN — IOPAMIDOL 100 ML: 755 INJECTION, SOLUTION INTRAVENOUS at 12:50

## 2024-02-26 RX ADMIN — MORPHINE SULFATE 4 MG: 4 INJECTION, SOLUTION INTRAMUSCULAR; INTRAVENOUS at 12:17

## 2024-02-26 NOTE — Clinical Note
[facility] EMERGENCY ROOM  [address] [place] [address]  Phone: [phone]  Fax: [phone]    Julia Spain was seen and treated in our emergency department on 2/26/2024.  She may return to work on 02/28/2024.         Thank you for choosing [facility].    Lisa Barlow MD

## 2024-02-26 NOTE — ED PROVIDER NOTES
Time: 2:35 PM EST  Date of encounter:  2/26/2024  Independent Historian/Clinical History and Information was obtained by:   Patient    History is limited by: N/A    Chief Complaint: Abdominal pain      History of Present Illness:  Patient is a 53 y.o. year old female who presents to the emergency department for evaluation of abdominal pain for the past several days.  The patient reports that she had a cardiac catheterization in which both groins were used.  Patient states that she noted the abdominal pain prior to the catheterization.  Patient denies dysuria and urinary frequency.  Patient has no chest pain or shortness of breath.  Patient has no cough or hemoptysis.  Patient denies dysuria and urinary frequency.    HPI    Patient Care Team  Primary Care Provider: Christina Brady APRN    Past Medical History:     Allergies   Allergen Reactions    Hydromorphone Itching and Hives     Past Medical History:   Diagnosis Date    Anemia     Anxiety     Depression     Headache     Hemorrhoids     Hiatal hernia     History of COVID-19 04/2022    Hypertension     Multiple joint pain      Past Surgical History:   Procedure Laterality Date    APPENDECTOMY  1993    CARDIAC ABLATION  02/20/2024    CHOLECYSTECTOMY  2016    ENDOSCOPY N/A 06/14/2022    Procedure: ESOPHAGOGASTRODUODENOSCOPY WITH BIOPSY;  Surgeon: Royce Solorio Jr., MD;  Location: [facility] ENDOSCOPY;  Service: General;  Laterality: N/A;  PRE- DYSPEPSIA, H/O SLEEVE  POST- GASTRITIS    GASTRIC SLEEVE LAPAROSCOPIC  06/25/2013    dr. valentin langford    KNEE SURGERY Right 2000     Family History   Problem Relation Age of Onset    Hyperlipidemia Mother     Hyperlipidemia Father     Hyperlipidemia Sister     Diabetes Sister     Hyperlipidemia Paternal Grandmother     Diabetes Paternal Grandmother     Malig Hyperthermia Neg Hx        Home Medications:  Prior to Admission medications    Medication Sig Start Date End Date Taking? Authorizing Provider   ALPRAZolam (XANAX) 0.25  MG tablet Take 1 tablet by mouth 2 (Two) Times a Day As Needed for Anxiety.    Isabell Omalley MD   cetirizine (zyrTEC) 10 MG tablet Take 1 tablet by mouth Daily.    Isabell Omalley MD   cyclobenzaprine (FLEXERIL) 10 MG tablet Take 1 tablet by mouth 3 (Three) Times a Day. 10/12/23   Lisa Barlow MD   Diclofenac Sodium (VOLTAREN) 1 % gel gel As Needed.    Isabell Omalley MD   fluticasone (FLONASE) 50 MCG/ACT nasal spray Instrill 1 spray into the affected nostril(s) as directed by provider Daily. 6/9/21      hydrOXYzine (ATARAX) 25 MG tablet Take 1 tablet by mouth every night at bedtime as needed. 6/9/21      IRON, FERROUS GLUCONATE, PO Take 256 mg by mouth Daily.    Isabell Omalley MD   metoprolol succinate XL (TOPROL-XL) 25 MG 24 hr tablet Take 1 tablet by mouth Daily.  Patient taking differently: Take 1 tablet by mouth Every Evening. 8/6/21      montelukast (SINGULAIR) 10 MG tablet Take 1 tablet by mouth every night at bedtime. 6/9/21      multivitamin with minerals (MULTIVITAMIN ADULTS PO) Take 1 tablet by mouth Daily.    Isabell Omalley MD   Semaglutide-Weight Management 2.4 MG/0.75ML solution auto-injector Inject 2.4 mg under the skin into the appropriate area as directed Every 7 (Seven) Days.    Isabell Omalley MD   Thyroid (NP THYROID) 60 MG PO tablet TAKE 1 TAB BY MOUTH ONCE DAILY IN THE MORNING 30 MINS BEFORE ANY OTHER FOOD/DRINK/MED 10/2/23   Isabell Omalley MD   vitamin B-12 (cyanocobalamin) 100 MCG tablet Take 0.5 tablets by mouth Daily.    Isabell Omalley MD   vitamin D3 (vitamin d) 125 MCG (5000 UT) capsule capsule Take 1 tablet by mouth Daily.    Isabell Omalley MD   Cholecalciferol 125 MCG (5000 UT) tablet Vitamin D3 125 mcg (5,000 unit) oral tablet take 1 tablet by oral route daily   Active  2/26/24  Isabell Omalley MD   ketorolac (TORADOL) 10 MG tablet Take 1 tablet by mouth Every 6 (Six) Hours As Needed for Moderate Pain. 10/12/23  "24  Lisa Barlow MD   lisinopril-hydrochlorothiazide (PRINZIDE,ZESTORETIC) 10-12.5 MG per tablet Take 1 tablet by mouth once daily 21     omeprazole (priLOSEC) 40 MG capsule Take 1 capsule by mouth Daily.  24  ProviderIsabell MD   potassium chloride 10 MEQ CR tablet Take 1 tablet by mouth Daily. 10/30/22 2/26/24  Rhys Vincent DO        Social History:   Social History     Tobacco Use    Smoking status: Former     Types: Cigarettes     Quit date:      Years since quittin.1     Passive exposure: Past    Smokeless tobacco: Never   Vaping Use    Vaping Use: Never used   Substance Use Topics    Alcohol use: Yes     Comment: MONTHLY    Drug use: Never         Review of Systems:  Review of Systems   Constitutional:  Negative for chills and fever.   HENT:  Negative for congestion, rhinorrhea and sore throat.    Eyes:  Negative for pain and visual disturbance.   Respiratory:  Negative for apnea, cough, chest tightness and shortness of breath.    Cardiovascular:  Negative for chest pain and palpitations.   Gastrointestinal:  Positive for abdominal pain. Negative for diarrhea, nausea and vomiting.   Genitourinary:  Negative for difficulty urinating and dysuria.   Musculoskeletal:  Negative for joint swelling and myalgias.   Skin:  Negative for color change.   Neurological:  Negative for seizures and headaches.   Psychiatric/Behavioral: Negative.     All other systems reviewed and are negative.       Physical Exam:  /80   Pulse 54   Temp 98 °F (36.7 °C)   Resp 16   Ht 172.7 cm (68\")   Wt 109 kg (240 lb 15.4 oz)   LMP 2024 (Approximate)   SpO2 100%   BMI 36.64 kg/m²     Physical Exam  Vitals and nursing note reviewed.   Constitutional:       General: She is not in acute distress.     Appearance: Normal appearance. She is not toxic-appearing.   HENT:      Head: Normocephalic and atraumatic.      Jaw: There is normal jaw occlusion.   Eyes:      General: Lids are " normal.      Extraocular Movements: Extraocular movements intact.      Conjunctiva/sclera: Conjunctivae normal.      Pupils: Pupils are equal, round, and reactive to light.   Cardiovascular:      Rate and Rhythm: Normal rate and regular rhythm.      Pulses: Normal pulses.      Heart sounds: Normal heart sounds.   Pulmonary:      Effort: Pulmonary effort is normal. No respiratory distress.      Breath sounds: Normal breath sounds. No wheezing or rhonchi.   Abdominal:      General: Abdomen is flat.      Palpations: Abdomen is soft.      Tenderness: There is no abdominal tenderness in the right lower quadrant. There is no guarding or rebound.   Musculoskeletal:         General: Normal range of motion.      Cervical back: Normal range of motion and neck supple.      Right lower leg: No edema.      Left lower leg: No edema.   Skin:     General: Skin is warm and dry.   Neurological:      Mental Status: She is alert and oriented to person, place, and time. Mental status is at baseline.   Psychiatric:         Mood and Affect: Mood normal.                  Procedures:  Procedures      Medical Decision Making:      Comorbidities that affect care:    Hypertension    External Notes reviewed:    Previous Clinic Note: Patient was seen in urgent care for abdominal pain.      The following orders were placed and all results were independently analyzed by me:  Orders Placed This Encounter   Procedures    CT Abdomen Pelvis With Contrast    CT Abdomen Pelvis Without Contrast    [place] Draw    Comprehensive Metabolic Panel    Lipase    Urinalysis With Microscopic If Indicated (No Culture) - Urine, Clean Catch    Lactic Acid, Plasma    CBC Auto Differential    Undress & Gown    CBC & Differential    Green Top (Gel)    Lavender Top    Gold Top - SST    Light Blue Top       Medications Given in the Emergency Department:  Medications   ketorolac (TORADOL) injection 30 mg (30 mg Intravenous Given 2/26/24 1217)   morphine injection 4 mg (4  mg Intravenous Given 2/26/24 1217)   iopamidol (ISOVUE-370) 76 % injection 100 mL (100 mL Intravenous Given 2/26/24 1250)        ED Course:    ED Course as of 02/27/24 1803   Mon Feb 26, 2024   1910 Patient discussed with [facility] on-call.  They are aware of this patient and feel comfortable with her going home after reading the CT reports and lab findings. [RP]      ED Course User Index  [RP] Adam Acuna MD       Labs:    Lab Results (last 24 hours)       ** No results found for the last 24 hours. **             Imaging:    No Radiology Exams Resulted Within Past 24 Hours      Differential Diagnosis and Discussion:    Abdominal Pain: Based on the patient's signs and symptoms, I considered abdominal aortic aneurysm, small bowel obstruction, pancreatitis, acute cholecystitis, acute appendecitis, peptic ulcer disease, gastritis, colitis, endocrine disorders, irritable bowel syndrome and other differential diagnosis an etiology of the patient's abdominal pain.    All labs were reviewed and interpreted by me.  CT scan radiology impression was interpreted by me.    MDM     Amount and/or Complexity of Data Reviewed  Clinical lab tests: reviewed  Tests in the radiology section of CPT®: reviewed  Decide to obtain previous medical records or to obtain history from someone other than the patient: yes       The patient´s CBC that was reviewed and interpreted by me shows no abnormalities of critical concern. Of note, there is no anemia requiring a blood transfusion and the platelet count is acceptable.  The patient´s CMP that was reviewed and interpretted by me shows no abnormalities of critical concern. Of note, the patient´s sodium and potassium are acceptable. The patient´s liver enzymes are unremarkable. The patient´s renal function (creatinine) is preserved. The patient has a normal anion gap.  CT scan shows a area that is 6.8 x 2 cm concerning for possible hemorrhage.  Repeat CT scan with p.o. contrast  performed.          Patient Care Considerations:    ANTIBIOTICS: I considered prescribing antibiotics as an outpatient however no bacterial focus of infection was found.      Consultants/Shared Management Plan:    Case was discussed with Dr. Chang who recommends a CT scan with p.o. contrast.    Social Determinants of Health:    Patient is independent, reliable, and has access to care.       Disposition and Care Coordination:    Discharged: I considered escalation of care by admitting this patient to the hospital, however the patient has improved and is suitable and  stable for discharge.    I have explained the patient´s condition, diagnoses and treatment plan based on the information available to me at this time. I have answered questions and addressed any concerns. The patient has a good  understanding of the patient´s diagnosis, condition, and treatment plan as can be expected at this point. The vital signs have been stable. The patient´s condition is stable and appropriate for discharge from the emergency department.      The patient will pursue further outpatient evaluation with the primary care physician or other designated or consulting physician as outlined in the discharge instructions. They are agreeable to this plan of care and follow-up instructions have been explained in detail. The patient has received these instructions in written format and has expressed an understanding of the discharge instructions. The patient is aware that any significant change in condition or worsening of symptoms should prompt an immediate return to this or the closest emergency department or call to 911.  I have explained discharge medications and the need for follow up with the patient/caretakers. This was also printed in the discharge instructions. Patient was discharged with the following medications and follow up:      Medication List        Changed      metoprolol succinate XL 25 MG 24 hr tablet  Commonly known as:  TOPROL-XL  Take 1 tablet by mouth Daily.  What changed: when to take this           No follow-up provider specified.     Final diagnoses:   Abdominal pain, unspecified abdominal location   Abdominal hematoma        ED Disposition       ED Disposition   Discharge    Condition   Stable    Comment   --               This medical record created using voice recognition software.             Lisa Barlow MD  02/27/24 [id number]

## 2024-02-27 NOTE — DISCHARGE INSTRUCTIONS
Return to emergency department immediately for dizziness with standing., passing out, uncontrolled pain, heart racing.  Call tomorrow to schedule follow-up with your cardiology group.

## (undated) DEVICE — MSK PROC CURAPLEX O2 2/ADAPT 7FT

## (undated) DEVICE — FRCP BX RADJAW4 NDL 2.8 240CM LG OG BX40

## (undated) DEVICE — LN SMPL CO2 SHTRM SD STREAM W/M LUER

## (undated) DEVICE — SENSR O2 OXIMAX FNGR A/ 18IN NONSTR

## (undated) DEVICE — TUBING, SUCTION, 1/4" X 10', STRAIGHT: Brand: MEDLINE

## (undated) DEVICE — KT ORCA ORCAPOD DISP STRL

## (undated) DEVICE — BITEBLOCK OMNI BLOC

## (undated) DEVICE — ADAPT CLN BIOGUARD AIR/H2O DISP